# Patient Record
Sex: MALE | Race: ASIAN | URBAN - METROPOLITAN AREA
[De-identification: names, ages, dates, MRNs, and addresses within clinical notes are randomized per-mention and may not be internally consistent; named-entity substitution may affect disease eponyms.]

---

## 2023-11-20 ENCOUNTER — LAB ENCOUNTER (OUTPATIENT)
Dept: LAB | Age: 38
End: 2023-11-20
Attending: PHYSICIAN ASSISTANT
Payer: COMMERCIAL

## 2023-11-20 DIAGNOSIS — Z01.812 PRE-OPERATIVE LABORATORY EXAMINATION: Primary | ICD-10-CM

## 2023-11-20 LAB
ALBUMIN SERPL-MCNC: 4.3 G/DL (ref 3.4–5)
ALBUMIN/GLOB SERPL: 0.9 {RATIO} (ref 1–2)
ALP LIVER SERPL-CCNC: 110 U/L
ALT SERPL-CCNC: 86 U/L
ANION GAP SERPL CALC-SCNC: 9 MMOL/L (ref 0–18)
APTT PPP: 31.3 SECONDS (ref 23.3–35.6)
AST SERPL-CCNC: 41 U/L (ref 15–37)
BASOPHILS # BLD AUTO: 0.02 X10(3) UL (ref 0–0.2)
BASOPHILS NFR BLD AUTO: 0.3 %
BILIRUB SERPL-MCNC: 0.6 MG/DL (ref 0.1–2)
BILIRUB UR QL STRIP.AUTO: NEGATIVE
BUN BLD-MCNC: 20 MG/DL (ref 9–23)
CALCIUM BLD-MCNC: 9.3 MG/DL (ref 8.5–10.1)
CHLORIDE SERPL-SCNC: 103 MMOL/L (ref 98–112)
CHOLEST SERPL-MCNC: 146 MG/DL (ref ?–200)
CO2 SERPL-SCNC: 25 MMOL/L (ref 21–32)
COLOR UR AUTO: YELLOW
CREAT BLD-MCNC: 1.4 MG/DL
CRP SERPL HS-MCNC: 25.8 MG/L (ref ?–3)
EGFRCR SERPLBLD CKD-EPI 2021: 67 ML/MIN/1.73M2 (ref 60–?)
EOSINOPHIL # BLD AUTO: 0.33 X10(3) UL (ref 0–0.7)
EOSINOPHIL NFR BLD AUTO: 4.3 %
ERYTHROCYTE [DISTWIDTH] IN BLOOD BY AUTOMATED COUNT: 15.1 %
EST. AVERAGE GLUCOSE BLD GHB EST-MCNC: 160 MG/DL (ref 68–126)
FASTING PATIENT LIPID ANSWER: YES
FASTING STATUS PATIENT QL REPORTED: YES
GLOBULIN PLAS-MCNC: 4.7 G/DL (ref 2.8–4.4)
GLUCOSE BLD-MCNC: 117 MG/DL (ref 70–99)
GLUCOSE UR STRIP.AUTO-MCNC: NORMAL MG/DL
HBA1C MFR BLD: 7.2 % (ref ?–5.7)
HCT VFR BLD AUTO: 47.5 %
HDLC SERPL-MCNC: 43 MG/DL (ref 40–59)
HGB BLD-MCNC: 14.9 G/DL
HYALINE CASTS #/AREA URNS AUTO: PRESENT /LPF
IMM GRANULOCYTES # BLD AUTO: 0.03 X10(3) UL (ref 0–1)
IMM GRANULOCYTES NFR BLD: 0.4 %
INR BLD: 1.15 (ref 0.8–1.2)
KETONES UR STRIP.AUTO-MCNC: NEGATIVE MG/DL
LDLC SERPL CALC-MCNC: 83 MG/DL (ref ?–100)
LEUKOCYTE ESTERASE UR QL STRIP.AUTO: NEGATIVE
LYMPHOCYTES # BLD AUTO: 1.53 X10(3) UL (ref 1–4)
LYMPHOCYTES NFR BLD AUTO: 19.9 %
MCH RBC QN AUTO: 26.4 PG (ref 26–34)
MCHC RBC AUTO-ENTMCNC: 31.4 G/DL (ref 31–37)
MCV RBC AUTO: 84.1 FL
MONOCYTES # BLD AUTO: 0.51 X10(3) UL (ref 0.1–1)
MONOCYTES NFR BLD AUTO: 6.6 %
NEUTROPHILS # BLD AUTO: 5.27 X10 (3) UL (ref 1.5–7.7)
NEUTROPHILS # BLD AUTO: 5.27 X10(3) UL (ref 1.5–7.7)
NEUTROPHILS NFR BLD AUTO: 68.5 %
NITRITE UR QL STRIP.AUTO: NEGATIVE
NONHDLC SERPL-MCNC: 103 MG/DL (ref ?–130)
OSMOLALITY SERPL CALC.SUM OF ELEC: 288 MOSM/KG (ref 275–295)
PH UR STRIP.AUTO: 5.5 [PH] (ref 5–8)
PLATELET # BLD AUTO: 250 10(3)UL (ref 150–450)
POTASSIUM SERPL-SCNC: 3.7 MMOL/L (ref 3.5–5.1)
PROT SERPL-MCNC: 9 G/DL (ref 6.4–8.2)
PROT UR STRIP.AUTO-MCNC: 30 MG/DL
PROTHROMBIN TIME: 14.7 SECONDS (ref 11.6–14.8)
PTH-INTACT SERPL-MCNC: 125.9 PG/ML (ref 18.5–88)
RBC # BLD AUTO: 5.65 X10(6)UL
RBC UR QL AUTO: NEGATIVE
SODIUM SERPL-SCNC: 137 MMOL/L (ref 136–145)
SP GR UR STRIP.AUTO: >1.03 (ref 1–1.03)
T3 SERPL-MCNC: 107 NG/DL (ref 60–181)
T3FREE SERPL-MCNC: 2.75 PG/ML (ref 2.4–4.2)
T4 FREE SERPL-MCNC: 1.2 NG/DL (ref 0.8–1.7)
TRIGL SERPL-MCNC: 111 MG/DL (ref 30–149)
TSI SER-ACNC: 3.14 MIU/ML (ref 0.36–3.74)
URATE SERPL-MCNC: 6.3 MG/DL
UROBILINOGEN UR STRIP.AUTO-MCNC: NORMAL MG/DL
VIT B12 SERPL-MCNC: 241 PG/ML (ref 193–986)
VIT D+METAB SERPL-MCNC: 6.2 NG/ML (ref 30–100)
VLDLC SERPL CALC-MCNC: 18 MG/DL (ref 0–30)
WBC # BLD AUTO: 7.7 X10(3) UL (ref 4–11)

## 2023-11-20 PROCEDURE — 84443 ASSAY THYROID STIM HORMONE: CPT

## 2023-11-20 PROCEDURE — 84255 ASSAY OF SELENIUM: CPT

## 2023-11-20 PROCEDURE — 80061 LIPID PANEL: CPT

## 2023-11-20 PROCEDURE — 84630 ASSAY OF ZINC: CPT

## 2023-11-20 PROCEDURE — 84550 ASSAY OF BLOOD/URIC ACID: CPT

## 2023-11-20 PROCEDURE — 85610 PROTHROMBIN TIME: CPT

## 2023-11-20 PROCEDURE — 81001 URINALYSIS AUTO W/SCOPE: CPT

## 2023-11-20 PROCEDURE — 84481 FREE ASSAY (FT-3): CPT

## 2023-11-20 PROCEDURE — 85025 COMPLETE CBC W/AUTO DIFF WBC: CPT

## 2023-11-20 PROCEDURE — 86141 C-REACTIVE PROTEIN HS: CPT

## 2023-11-20 PROCEDURE — 85730 THROMBOPLASTIN TIME PARTIAL: CPT

## 2023-11-20 PROCEDURE — 82306 VITAMIN D 25 HYDROXY: CPT

## 2023-11-20 PROCEDURE — 83036 HEMOGLOBIN GLYCOSYLATED A1C: CPT

## 2023-11-20 PROCEDURE — 80053 COMPREHEN METABOLIC PANEL: CPT

## 2023-11-20 PROCEDURE — 36415 COLL VENOUS BLD VENIPUNCTURE: CPT

## 2023-11-20 PROCEDURE — 84439 ASSAY OF FREE THYROXINE: CPT

## 2023-11-20 PROCEDURE — 82607 VITAMIN B-12: CPT

## 2023-11-20 PROCEDURE — 83970 ASSAY OF PARATHORMONE: CPT

## 2023-11-24 LAB — SELENIUM LVL: 139 UG/L

## 2023-12-01 LAB — ZINC: 79 UG/DL

## 2023-12-07 ENCOUNTER — LAB ENCOUNTER (OUTPATIENT)
Dept: LAB | Age: 38
End: 2023-12-07
Attending: SURGERY
Payer: COMMERCIAL

## 2023-12-07 DIAGNOSIS — Z01.818 PREOP TESTING: ICD-10-CM

## 2023-12-07 DIAGNOSIS — E66.01 MORBID OBESITY (HCC): Primary | ICD-10-CM

## 2023-12-07 PROCEDURE — 83013 H PYLORI (C-13) BREATH: CPT

## 2023-12-10 LAB — H PYLORI BREATH TEST: NEGATIVE

## 2025-05-21 ENCOUNTER — TELEPHONE (OUTPATIENT)
Dept: PHYSICAL THERAPY | Facility: HOSPITAL | Age: 40
End: 2025-05-21
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
## 2025-05-27 ENCOUNTER — OFFICE VISIT (OUTPATIENT)
Dept: SPEECH THERAPY | Facility: HOSPITAL | Age: 40
End: 2025-05-27
Payer: COMMERCIAL

## 2025-05-27 DIAGNOSIS — R41.841 COGNITIVE COMMUNICATION DEFICIT: Primary | ICD-10-CM

## 2025-05-27 DIAGNOSIS — F09 COGNITIVE DISORDER: ICD-10-CM

## 2025-05-27 DIAGNOSIS — S06.330S: ICD-10-CM

## 2025-05-27 PROCEDURE — 92523 SPEECH SOUND LANG COMPREHEN: CPT
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
## 2025-05-28 ENCOUNTER — OFFICE VISIT (OUTPATIENT)
Dept: OCCUPATIONAL MEDICINE | Facility: HOSPITAL | Age: 40
End: 2025-05-28
Payer: COMMERCIAL

## 2025-05-28 ENCOUNTER — OFFICE VISIT (OUTPATIENT)
Dept: PHYSICAL THERAPY | Facility: HOSPITAL | Age: 40
End: 2025-05-28
Payer: COMMERCIAL

## 2025-05-28 PROCEDURE — 97110 THERAPEUTIC EXERCISES: CPT

## 2025-05-28 PROCEDURE — 97112 NEUROMUSCULAR REEDUCATION: CPT

## 2025-05-28 PROCEDURE — 97530 THERAPEUTIC ACTIVITIES: CPT

## 2025-05-28 NOTE — PROGRESS NOTES
Patient: Ender Aguirre (40 year old, male) Referring Provider:  Insurance:   Diagnosis: Cognitive disorder (F09)  Impaired mobility (Z74.09)  Intraparenchymal hematoma of brain without loss of consciousness, sequela (S06.330S) Jerson Somers  [facility]   Date of Surgery: 5/8/2025 Next MD visit:  N/A   Precautions:  None n/a Referral Information:   Date of Injury: 5/7/2025 Date of Evaluation: Req: 0, Auth: 0, Exp:     05/22/25 POC Auth Visits:          Today's Date   5/28/2025    Subjective  Patient presents to OT stating that his symptoms feel about the same as during his previous visit. He expressed a strong desire to return to driving as well as other functional tasks.       Pain: 4/10     Objective  Patient engaged in cognitive activities targeting decision-making and problem-solving skills.      Orthotics: cervical brace     ROM and Strength:  (* denotes performed with pain)  Hand Strength (lbs) R L      62.8 76.6 lbs     2 pt Pinch 9 9     3 pt Pinch 15 16     Lateral pinch 15 19       Neurological:  Cognition:   Overall Cognitive Status: impaired  Arousal/Alertness: appropriate responses to stimuli  Attention Span: difficulty dividing attention  Orientation Level: oriented x4  Memory: impaired working memory  Initiation: appears intact  Motor Planning: intact  Perseveration: not present  Awareness of Errors: assistance required to identify errors made  Awareness of Deficits: decreased awareness of deficits (patient reports not understaning why he may be having difficulty with some cognitive processes)  Problem Solving: assistance required to generate solutions    Mcgrew Cognitive Assessment: 19/30 with deficits in:  Visuospatial/executive function, attention, language, abstraction, and delayed recall.       ,   Vision: will be assessed at the following visit    Current Vision: other  Visual History: brain injury  Patient Visual Report: blurring of print when reading  Ocular Range of Motion: restricted on the  right  Head Position: -- (patient is in cervical brace)  Tracking:   tracked in all 4 quadrants smoothly    Saccades:  undershooting on the R side   Convergence:  breaks about 6 inches from nose  Acuity: able to read name tag with little difficulty.     ,   Perception:   Overall Perception Status: impaired  Left Right Discrimination: -- (R sided hemianopia)  Body Scheme: intact  Left Attention: intact  Right Attention: impaired   Visual Closure:  intact  Figure Ground:  intact  Depth Perception:  intact   Spatial Orientation:  intact  Visual Discrimination: R sided menianopia        Sensory: WNL  Spasticity: n/a    Coordination:   9 Hole Peg Test: R: 35.95 seconds   L: 19.99 seconds        Assessment  Patient continues to demonstrate motivation toward functional independence, particularly in his expressed goal of returning to driving. OT recommended the use of simulated driving activities, such as video games like [employer], to safely practice and enhance reaction time, visual-motor coordination, and decision-making skills. His engagement and motivation indicate good potential for progress with continued therapeutic intervention.    Goals (to be met in 12 visits)   Pt will improve in 10 lbs in  strength to increase upper extremity strength, range of motion, and coordination to support daily tasks.    Pt will enhance fine motor skills for improved hand-eye coordination and object manipulation to support self-care tasks,   Pt will increase endurance and activity tolerance to 30 minutes with no breaks to support participation in ADL/IADL.    Pt will be independent and compliant with comprehensive HEP to maintain progress achieved in OT.    Pt will improve sustained attention by independently completing a familiar task for 30 minutes with no breaks to support independence and success in activities like leisure and social participation.    Pt will initiate and complete a multi-step task, such as meal preparation or  household chores by independently organizing and following a written or digital checklist.          Plan  Patient will be seen 1-2 x/week or a total of 12 visits over a 90 day period. Treatment will include: Therapeutic Activities; Self-Care Home Management; Neuromuscular Re-education; Therapeutic Exercise; Home Exercise Program instruction; Electrical stimulation (attended); Ultrasound; Manual Therapy    Treatment Last 4 Visits        5/22/2025 5/28/2025   OT Treatment   Treatment Day  2   Therapeutic Exercise HEP review  Vision assessment   9-hole peg      Therapeutic Activity  Sudoku   Blink    Therapeutic Exercise Min 10 15   Ther Activity Min  30   Eval Min 35    Total Timed Procedures 10 45   Total Service Procedures 45 45   Total Time 45 45         HEP  Luminosity   Cognitive recovery strategies: daily orientation     Charges     TAx2, TEx1

## 2025-05-28 NOTE — PROGRESS NOTES
Patient: Ender Aguirre (40 year old, male) Referring Provider:  Insurance:   Diagnosis: Cognitive disorder (F09)  Impaired mobility (Z74.09)  Intraparenchymal hematoma of brain without loss of consciousness, sequela (S06.330S) Physician Alvatamalik BOWER   Date of Surgery: No data recorded Next MD visit:  N/A   Precautions:  Fall Risk No data recorded Referral Information:    Date of Evaluation: Req: 1, Auth: 1, Exp: 5/20/2026 05/22/25 POC Auth Visits:  10       Today's Date   5/28/2025    Subjective  He has is neuro surgery follow up on 6/18 and CT with contrast (with [facility]). Pt feels his attention and energy are normal.  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       Pain: 0/10     Objective  SLS on airex:20\"         Assessment  Pt was given 3 words to remeber at start of session (banana, cat, tree), he was able to recall them without prompting 3 min later. After asking to recall them at minute 10, he could not, but used assossciation to say yellow, when clued that he was close with the color he came up with banana but could not recall the others. His balance was decent but he does tend to rely on ankle strategies on compliant surfaces. He still has amnesia surrounding the accident. WIll cont to progress dynamic balance with cog activites. Administererd a balance HEP, add to this as needed.    Goals (to be met in 10 visits)     Not Met Progress Toward Partially Met Met    Pt will demonstrate improved SLS to >30 seconds YURIDIA to promote safety and decrease risk of falls on uneven surfaces such as grass and gravel. []  []  []  []    Pt will complete a 6MWT to determine baseline walking endurance.  []  []  []  []    Pt will perform FGA with score of 28/30 or greater with least restrictive AD to demonstrate ability to ambulate safely in crowded and busy environments such as the grocery store. []  []  []  []    Pt will be able to ambulate without path deviation while completing dual task to be able to safely navigate  community spaces without losing balance. []  []  []  []    Pt will complete NB EC balance on complaint surface with minimal to no sway to safely be able to  shower without loss of balance.  []  []  []  []    Pt will be independent and compliant with comprehensive HEP to maintain progress achieved in PT. []  []  []  []        Plan  Progress dynamic balance c cog activites.    Treatment Last 4 Visits  Treatment Day: 2       5/22/2025 5/28/2025   Neuro Treatment   Therapeutic Exercise  5 min biodex treadmill at 65 cm step length at 1.9 mph  15x posterior lunge   Neuro Re-Education Pt education: reducing cognitive load with physical activities to prevent falls or running into things. Education on length of recovery and recovery post TBI \"Banana cat tree\" given at start of session, tested him on it 10 min later (0% correct without cuing associated yellow with banana).   to pegs, left and right hand, standing romberg on airex  2x20\" tandem on airex while reaching for and calling out name/suite of cards, R/L no HHA  10x step up to upside down bosu  2x30\"  romberg EC with tactile cues on spine.   Biodex:  Limits of stabilityx2 (level 6 and 8)  Maze control x2     Therapeutic Exercise Minutes  10   Neuro Re-Educ Minutes 10 35   Evaluation Minutes 25    Total Time Of Timed Procedures 10 45   Total Time Of Service-Based Procedures 25 0   Total Treatment Time 35 45   HEP  Access Code: [email or web address]  URL: [email or web address]  Date: 05/28/2025  Prepared by: Allison Torres    Exercises  - Romberg Stance with Eyes Closed  - 1 x daily - 7 x weekly - 3 sets - 30sec hold  - Tandem Walking  - 1 x daily - 7 x weekly - 3 sets  - Reverse Lunge  - 1 x daily - 7 x weekly - 3 sets - 10 reps  - Single Leg Stance  - 1 x daily - 7 x weekly - 3 sets - 20 sec hold        HEP  Access Code: [email or web address]  URL: [email or web address]  Date: 05/28/2025  Prepared by: Allison Ma  - Romberg  Stance with Eyes Closed  - 1 x daily - 7 x weekly - 3 sets - 30sec hold  - Tandem Walking  - 1 x daily - 7 x weekly - 3 sets  - Reverse Lunge  - 1 x daily - 7 x weekly - 3 sets - 10 reps  - Single Leg Stance  - 1 x daily - 7 x weekly - 3 sets - 20 sec hold    Charges  therex;1, neuro re-ed:2

## 2025-05-29 ENCOUNTER — APPOINTMENT (OUTPATIENT)
Dept: SPEECH THERAPY | Facility: HOSPITAL | Age: 40
End: 2025-05-29
Payer: COMMERCIAL

## 2025-05-29 ENCOUNTER — OFFICE VISIT (OUTPATIENT)
Dept: SPEECH THERAPY | Facility: HOSPITAL | Age: 40
End: 2025-05-29
Payer: COMMERCIAL

## 2025-05-29 PROCEDURE — 92507 TX SP LANG VOICE COMM INDIV: CPT

## 2025-05-29 NOTE — PROGRESS NOTES
Patient: Ender Aguirre (40 year old, male) Referring Provider:  Insurance:   Diagnosis: cognitive communication deficit Physician Nonstaff  [employer]   Precautions:  Other (use comment) (TBI, vision deficits) Next MD visit:  N/A    No data recorded Referral Information:    Date of Evaluation: Req: 0, Auth: 0, Exp:     05/27/25 POC Auth Visits:  12       Today's Date   5/29/2025        Treatment Day: 2    Subjective  Patient arrived on time to session accompanied by his wife. Patient participated actively in therapeutic tasks.       Pain: 0/10     Objective  See goals below.     Goals (to be met in 12 visits)     Patient will verbalize compensatory memory strategies (processing, working memory, short-term memory, attention, problem-solving) and describe 1 use per strategy given min verbal and visual cues.   Trained on compensatory WRAP strategies. Patient demonstrates understanding. Further training required.   Progress: Progressing   2.   Patient will utilize an external memory aid to recall daily activities and/or prospective tasks within 80% of opportunities given min verbal and visual cues.   Trained on use of external notebook to support recall. Patient to attempt to implement this at home.   Progress:   Progressing   3.   Patient will recall average of 6+ units of novel information after 5 minute delay given mod verbal and visual cues for retrieval.   3 units following immediate recall with 2 repetitions of 1-paragraph length reading.   Progress: Progressing   4.   Patient will demonstrate sustained attention by maintaining focus during a task for 10 minutes given min verbal and visual cues in a structured environment without distractions.   10 minutes given mod-max verbal and visual cues to redirect.   Progress: Progressing   5.  Patient will describe pictures/objects using average of 4 attributes (e.g. function, material, location, etc) given min verbal and visual cues.   4 attributes given direct instruction    Progress: Progressing                        Assessment  Patient presents with cognitive communication deficits c/b impulsivity, impaired memory, attention, problem solving, and executive functioning, and lexical retrieval deficits which appear to be 2/2 impairments in time processing and mental flexibility. Functional deficits include but are not limited to difficulty performing at prior level of independence d/t cognitive deficits. Patient demonstrates receptiveness to training on compensatory memory and word finding strategies. Patient requires continued training and structured practice to support learning and implementation of supports. Continued intervention is medically necessary.    Plan  Continue speech therapy targeting cognitive communication.    HEP  WRAP, spaced retrieval, word finding strategies, reading comprehension     Charges  [id number]    Total Treatment Time: 45 min

## 2025-05-30 ENCOUNTER — APPOINTMENT (OUTPATIENT)
Dept: OCCUPATIONAL MEDICINE | Facility: HOSPITAL | Age: 40
End: 2025-05-30
Payer: COMMERCIAL

## 2025-05-30 ENCOUNTER — OFFICE VISIT (OUTPATIENT)
Dept: OCCUPATIONAL MEDICINE | Facility: HOSPITAL | Age: 40
End: 2025-05-30
Payer: COMMERCIAL

## 2025-05-30 PROCEDURE — 97530 THERAPEUTIC ACTIVITIES: CPT

## 2025-06-01 NOTE — PROGRESS NOTES
Patient: Ender Aguirre (40 year old, male) Referring Provider:  Insurance:   Diagnosis: Cognitive disorder (F09)  Impaired mobility (Z74.09)  Intraparenchymal hematoma of brain without loss of consciousness, sequela (S06.330S) Jerson BOWER   Date of Surgery: 5/8/2025 Next MD visit:  N/A   Precautions:  None n/a Referral Information:   Date of Injury: 5/7/2025 Date of Evaluation: Req: 0, Auth: 0, Exp:     05/22/25 POC Auth Visits:          Today's Date   5/30/2025    Subjective  Patient presents to OT expressing concern regarding when his vision will return to baseline. He shared that his primary goal is to return to driving and is feeling frustrated with the anticipated timeline to achieve this.       Pain: 3/10     Objective  Patient presents to OT expressing concern regarding when his vision will return to baseline. He shared that his primary goal is to return to driving and is feeling frustrated with the anticipated timeline to achieve this.      Orthotics: cervical brace     ROM and Strength:  (* denotes performed with pain)  Hand Strength (lbs) R L      62.8 76.6 lbs     2 pt Pinch 9 9     3 pt Pinch 15 16     Lateral pinch 15 19       Neurological:  Cognition:   Overall Cognitive Status: impaired  Arousal/Alertness: appropriate responses to stimuli  Attention Span: difficulty dividing attention  Orientation Level: oriented x4  Memory: impaired working memory  Initiation: appears intact  Motor Planning: intact  Perseveration: not present  Awareness of Errors: assistance required to identify errors made  Awareness of Deficits: decreased awareness of deficits (patient reports not understaning why he may be having difficulty with some cognitive processes)  Problem Solving: assistance required to generate solutions    Patrice Cognitive Assessment: 19/30 with deficits in:  Visuospatial/executive function, attention, language, abstraction, and delayed recall.       ,   Vision: will be assessed at the  following visit    Current Vision: other  Visual History: brain injury  Patient Visual Report: blurring of print when reading  Ocular Range of Motion: restricted on the right  Head Position: -- (patient is in cervical brace)  Tracking:   tracked in all 4 quadrants smoothly    Saccades:  undershooting on the R side   Convergence:  breaks about 6 inches from nose  Acuity: able to read name tag with little difficulty.     ,   Perception:   Overall Perception Status: impaired  Left Right Discrimination: -- (R sided hemianopia)  Body Scheme: intact  Left Attention: intact  Right Attention: impaired   Visual Closure:  intact  Figure Ground:  intact  Depth Perception:  intact   Spatial Orientation:  intact  Visual Discrimination: R sided menianopia        Sensory: WNL  Spasticity: n/a    Coordination:   9 Hole Peg Test: R: 35.95 seconds   L: 19.99 seconds          Assessment  Patient continues to make progress cognitively and demonstrates improved tolerance for errors, with reduced frustration when making mistakes. His insight into current limitations and future goals remains strong, which supports engagement and motivation in therapy. Continued focus on visual-perceptual skills, cognitive endurance, and problem solving will support his long-term goal of community reintegration, including driving.    Goals (to be met in 12 visits)   Pt will improve in 10 lbs in  strength to increase upper extremity strength, range of motion, and coordination to support daily tasks.    Pt will enhance fine motor skills for improved hand-eye coordination and object manipulation to support self-care tasks,   Pt will increase endurance and activity tolerance to 30 minutes with no breaks to support participation in ADL/IADL.    Pt will be independent and compliant with comprehensive HEP to maintain progress achieved in OT.    Pt will improve sustained attention by independently completing a familiar task for 30 minutes with no breaks to  support independence and success in activities like leisure and social participation.    Pt will initiate and complete a multi-step task, such as meal preparation or household chores by independently organizing and following a written or digital checklist.            Plan  Patient will be seen 1-2 x/week or a total of 12 visits over a 90 day period. Treatment will include: Therapeutic Activities; Self-Care Home Management; Neuromuscular Re-education; Therapeutic Exercise; Home Exercise Program instruction; Electrical stimulation (attended); Ultrasound; Manual Therapy    Treatment Last 4 Visits        5/22/2025 5/28/2025 5/30/2025   OT Treatment   Treatment Day  2    Therapeutic Exercise HEP review  Vision assessment   9-hole peg       Therapeutic Activity  Sudoku   Blink  Sodoku (puzzle places on Right side to promote visual scanning to R side of visual field)   Perfection (puzzle places on Right side to promote visual scanning to R side of visual field)    Therapeutic Exercise Min 10 15    Ther Activity Min  30 45   Eval Min 35     Total Timed Procedures 10 45 45   Total Service Procedures 45 45 45   Total Time 45 45 45         HEP  Luminosity   Cognitive recovery strategies: daily orientation     Charges     TAx3

## 2025-06-02 ENCOUNTER — OFFICE VISIT (OUTPATIENT)
Dept: SPEECH THERAPY | Facility: HOSPITAL | Age: 40
End: 2025-06-02
Payer: COMMERCIAL

## 2025-06-02 ENCOUNTER — APPOINTMENT (OUTPATIENT)
Dept: OCCUPATIONAL MEDICINE | Facility: HOSPITAL | Age: 40
End: 2025-06-02
Payer: COMMERCIAL

## 2025-06-02 PROCEDURE — 92507 TX SP LANG VOICE COMM INDIV: CPT

## 2025-06-02 NOTE — PROGRESS NOTES
Patient: Ender Aguirre (40 year old, male) Referring Provider:  Insurance:   Diagnosis: cognitive communication deficit Physician Nonstaff  [employer]   Precautions:  Other (use comment) (TBI, vision deficits) Next MD visit:  N/A    No data recorded Referral Information:    Date of Evaluation: Req: 0, Auth: 0, Exp:     05/27/25 POC Auth Visits:  12       Today's Date   6/2/2025        Treatment Day: 3    Subjective  Patient arrived on time to session accompanied by his wife. Patient participated actively in therapeutic tasks.       Pain: 0/10     Objective  See goals below.      Goals (to be met in 12 visits)  Patient will verbalize compensatory memory strategies (processing, working memory, short-term memory, attention, problem-solving) and describe 1 use per strategy given min verbal and visual cues.   Reports attempts to write information down. Will continue to try to embed in daily routine.   Progress: Progressing   2.   Patient will utilize an external memory aid to recall daily activities and/or prospective tasks within 80% of opportunities given min verbal and visual cues.   Continue to discuss use of external memory aids within the home and at appointments   Progress:   Progressing   3.   Patient will recall average of 6+ units of novel information after 5 minute delay given mod verbal and visual cues for retrieval.   4 units following immediate recall with 2 repetitions of 1-paragraph length reading.   Progress: Progressing   4.   Patient will demonstrate sustained attention by maintaining focus during a task for 10 minutes given min verbal and visual cues in a structured environment without distractions.   10 minutes given mod verbal and visual cues to redirect.   Progress: Progressing   5.  Patient will describe pictures/objects using average of 4 attributes (e.g. function, material, location, etc) given min verbal and visual cues.   Goal not targeted on this date. Patient demonstrates improved lexical  retrieval skills. Previous session data: 4 attributes given direct instruction   Progress: Progressing                        Assessment  Patient presents with cognitive communication deficits c/b impulsivity, impaired memory, attention, problem solving, and executive functioning, and lexical retrieval deficits which appear to be 2/2 impairments in time processing and mental flexibility. Functional deficits include but are not limited to difficulty performing at prior level of independence d/t cognitive deficits. Patient does demonstrate effective lexical retrieval for communication and summarization tasks. Patient continues to require cueing to reduce impulsivity and increase accuracy of recall. Continued intervention is medically necessary.    Plan  Continue speech therapy targeting cognitive communication.    HEP  WRAP, spaced retrieval, word finding strategies, reading comprehension     Charges  [id number]    Total Treatment Time: 45 min

## 2025-06-04 ENCOUNTER — APPOINTMENT (OUTPATIENT)
Dept: SPEECH THERAPY | Facility: HOSPITAL | Age: 40
End: 2025-06-04
Payer: COMMERCIAL

## 2025-06-04 ENCOUNTER — OFFICE VISIT (OUTPATIENT)
Dept: PHYSICAL THERAPY | Facility: HOSPITAL | Age: 40
End: 2025-06-04
Payer: COMMERCIAL

## 2025-06-04 ENCOUNTER — OFFICE VISIT (OUTPATIENT)
Dept: SPEECH THERAPY | Facility: HOSPITAL | Age: 40
End: 2025-06-04
Payer: COMMERCIAL

## 2025-06-04 PROCEDURE — 97116 GAIT TRAINING THERAPY: CPT

## 2025-06-04 PROCEDURE — 92507 TX SP LANG VOICE COMM INDIV: CPT

## 2025-06-04 PROCEDURE — 97112 NEUROMUSCULAR REEDUCATION: CPT

## 2025-06-04 PROCEDURE — 97110 THERAPEUTIC EXERCISES: CPT

## 2025-06-04 NOTE — PROGRESS NOTES
Patient: Ender Aguirre (40 year old, male) Referring Provider:  Insurance:   Diagnosis: cognitive communication deficit Physician Nonstaff  [employer]   Precautions:  Other (use comment) (TBI, vision deficits) Next MD visit:  N/A    No data recorded Referral Information:    Date of Evaluation: Req: 0, Auth: 0, Exp:     05/27/25 POC Auth Visits:  12       Today's Date   6/4/2025        Treatment Day: 4    Subjective  Patient arrived on time to session following PT session accompanied by his wife. Patient participated actively in therapeutic tasks.       Pain: 0/10     Objective  See goals below.         Goals (to be met in 12 visits)  Patient will verbalize compensatory memory strategies (processing, working memory, short-term memory, attention, problem-solving) and describe 1 use per strategy given min verbal and visual cues.   Reports attempts to write information down. Will continue to try to embed in daily routine.   Progress: Progressing   2.   Patient will utilize an external memory aid to recall daily activities and/or prospective tasks within 80% of opportunities given min verbal and visual cues.   Continue to discuss use of external memory aids within the home and at appointments   Progress:   Progressing   3.   Patient will recall average of 6+ units of novel information after 5 minute delay given mod verbal and visual cues for retrieval.   8 units after 15 minute delay following repetition of article and mod verbal and visual cues for complex encoding of information.   Progress: Progressing   4.   Patient will demonstrate sustained attention by maintaining focus during a task for 10 minutes given min verbal and visual cues in a structured environment without distractions.   10 minutes given min verbal and visual cues.   Progress: Progressing   5.  Patient will describe pictures/objects using average of 4 attributes (e.g. function, material, location, etc) given min verbal and visual cues.   Patient exhibits  functional lexical retrieval for daily communication.   Progress: Progressing        Assessment  Patient presents with cognitive communication deficits c/b impulsivity, impaired memory, attention, problem solving, and executive functioning, and lexical retrieval deficits which appear to be 2/2 impairments in time processing and mental flexibility. Functional deficits include but are not limited to difficulty performing at prior level of independence d/t cognitive deficits. Patient demonstrates improved memory for paragraph length stimuli and benefits from verbal repetition, preparatory sets, and multiple choice cues for accurate recall. Patient demonstrates effective lexical retrieval for daily communication. Continued intervention targeting cognitive communication is medically necessary.    Plan  Continue speech therapy targeting cognitive communication.    HEP  WRAP, spaced retrieval, word finding strategies, reading comprehension     Charges  [id number]    Total Treatment Time: 45 min

## 2025-06-04 NOTE — PROGRESS NOTES
Patient: Ender Agurire (40 year old, male) Referring Provider:  Insurance:   Diagnosis: Cognitive disorder (F09)  Impaired mobility (Z74.09)  Intraparenchymal hematoma of brain without loss of consciousness, sequela (S06.330S) Physician Alvatamalik BOWER   Date of Surgery: No data recorded Next MD visit:  N/A   Precautions:  Fall Risk No data recorded Referral Information:    Date of Evaluation: Req: 0, Auth: 0, Exp:     05/22/25 POC Auth Visits:  10       Today's Date   6/4/2025    Subjective  Pt doing well today, no changes to report. Has to avoid OH movements due to fx in the R shoulder       Pain: pain not reported     Objective  See tx flow sheet; improved recall of 3 words provided at beginning of session. After 10 min, able to recall 2 words with min cues, and requires 3rd work to be provided. Pt able to successfully recall 2 words at end of session with no cues, and min cues for recalling apple.          Assessment  Pt has good participation in session today. Improved recall of 3 words provided as seen above in objective. Continue with balance interventions with dual tasking as able. Pt has trouble with identifying difference between clubs and spades when naming and matching cards to board. Able to complete with ~25% assist and improves with repetition. Pt requires VC throughout session to slow down movements and improve control of activities to reduce fall risk. Gait training completed with alphabet game. Pt maintains good gait mechanics, 75% of time able to appropriately name girl or boy names in the alphabet, but with more challenging letters comes up with words that are not names. With cues he is able to come up with names some of the time following. Continue per plan of care to improve dynamic balance, cognitive activities and reduce impulsivity.    Goals (to be met in 10 visits)      Not Met Progress Toward Partially Met Met   Pt will demonstrate improved SLS to >30 seconds YURIDIA to promote safety and  decrease risk of falls on uneven surfaces such as grass and gravel. [] [] [] []   Pt will complete a 6MWT to determine baseline walking endurance.  [] [] [] []   Pt will perform FGA with score of 28/30 or greater with least restrictive AD to demonstrate ability to ambulate safely in crowded and busy environments such as the grocery store. [] [] [] []   Pt will be able to ambulate without path deviation while completing dual task to be able to safely navigate community spaces without losing balance. [] [] [] []   Pt will complete NB EC balance on complaint surface with minimal to no sway to safely be able to  shower without loss of balance.  [] [] [] []   Pt will be independent and compliant with comprehensive HEP to maintain progress achieved in PT. [] [] [] []             Plan  Progress dynamic balance c cog activites.    Treatment Last 4 Visits  Treatment Day: 3       5/22/2025 5/28/2025 6/4/2025   Neuro Treatment   Therapeutic Exercise  5 min biodex treadmill at 65 cm step length at 1.9 mph  15x posterior lunge 5 min biodex treadmill at 65 cm step length at 1.9 mph   15x posterior lunge w/ slider  15x lat lunge w/ slider     Neuro Re-Education Pt education: reducing cognitive load with physical activities to prevent falls or running into things. Education on length of recovery and recovery post TBI \"Banana cat tree\" given at start of session, tested him on it 10 min later (0% correct without cuing associated yellow with banana).   to pegs, left and right hand, standing romberg on airex  2x20\" tandem on airex while reaching for and calling out name/suite of cards, R/L no HHA  10x step up to upside down bosu  2x30\"  romberg EC with tactile cues on spine.   Biodex:  Limits of stabilityx2 (level 6 and 8)  Maze control x2   \"Apple, dog, car\" given at start of session, tested him on it 10 min later (2/3 right after 2 attempts w/o cues, has to be told apple, at end of session names 2 w/o cues, 1 cue needed  for apple but able to associate color)  10x step up to upside down bosu, 1 UE  10x lat step up to upside down bosu, no UE as able  Single leg, taps to cones, x10 bilat  2x45\" tandem on airex while reaching for and calling out name/suite of cards, R/L no HHA   2x30\"  romberg EC on airex with tactile cues on spine.   Biodex: w/ foam  Limits of stability x2 level 6   Maze control x2, level 6, BUE support  NB/half tandem on airex, matching cards to board with verbal call out of suite and number (most difficulty differentiating between clubs and spades, able to complete correctly ~75% of time)     Gait Training   Walking with [employer] game, multiple bouts. Pt able to accurately pick appropriate female or male name for letter of the alphabet ~80% of time. With more difficult letters, often uses words that are not names, verbal cues to attempt to pick name    Therapeutic Exercise Minutes  10 10   Neuro Re-Educ Minutes 10 35 20   Gait Training Minutes   10   Evaluation Minutes 25     Total Time Of Timed Procedures 10 45 40   Total Time Of Service-Based Procedures 25 0 0   Total Treatment Time 35 45 40   HEP  Access Code: [email or web address]  URL: [email or web address]  Date: 05/28/2025  Prepared by: Allison Torres    Exercises  - Romberg Stance with Eyes Closed  - 1 x daily - 7 x weekly - 3 sets - 30sec hold  - Tandem Walking  - 1 x daily - 7 x weekly - 3 sets  - Reverse Lunge  - 1 x daily - 7 x weekly - 3 sets - 10 reps  - Single Leg Stance  - 1 x daily - 7 x weekly - 3 sets - 20 sec hold         HEP  Access Code: [email or web address]  URL: [email or web address]  Date: 05/28/2025  Prepared by: Allison Torres    Exercises  - Romberg Stance with Eyes Closed  - 1 x daily - 7 x weekly - 3 sets - 30sec hold  - Tandem Walking  - 1 x daily - 7 x weekly - 3 sets  - Reverse Lunge  - 1 x daily - 7 x weekly - 3 sets - 10 reps  - Single Leg Stance  - 1 x daily - 7 x weekly - 3 sets - 20 sec hold    Charges  NR 2 TE 1  GT1

## 2025-06-09 ENCOUNTER — OFFICE VISIT (OUTPATIENT)
Dept: PHYSICAL THERAPY | Facility: HOSPITAL | Age: 40
End: 2025-06-09
Payer: COMMERCIAL

## 2025-06-09 ENCOUNTER — OFFICE VISIT (OUTPATIENT)
Dept: SPEECH THERAPY | Facility: HOSPITAL | Age: 40
End: 2025-06-09
Payer: COMMERCIAL

## 2025-06-09 ENCOUNTER — OFFICE VISIT (OUTPATIENT)
Dept: OCCUPATIONAL MEDICINE | Facility: HOSPITAL | Age: 40
End: 2025-06-09
Payer: COMMERCIAL

## 2025-06-09 PROCEDURE — 92507 TX SP LANG VOICE COMM INDIV: CPT

## 2025-06-09 PROCEDURE — 97110 THERAPEUTIC EXERCISES: CPT

## 2025-06-09 PROCEDURE — 97112 NEUROMUSCULAR REEDUCATION: CPT

## 2025-06-09 PROCEDURE — 97530 THERAPEUTIC ACTIVITIES: CPT

## 2025-06-09 PROCEDURE — 97116 GAIT TRAINING THERAPY: CPT

## 2025-06-09 NOTE — PROGRESS NOTES
Patient: Ender Aguirre (40 year old, male) Referring Provider:  Insurance:   Diagnosis: cognitive communication deficit Physician Nonstaff  [employer]   Precautions:  Other (use comment) (TBI, vision deficits) Next MD visit:  N/A    No data recorded Referral Information:    Date of Evaluation: Req: 0, Auth: 0, Exp:     05/27/25 POC Auth Visits:  12     Discharge Summary  Pt has attended 5 visits in Speech Therapy.   Today's Date   6/9/2025        Treatment Day: 5    Dear Dr. Somers  This letter is to inform you of Tanmay Aguirre's progress in speech-language therapy.    Since his initial evaluation, Tanmay has attended 5 sessions. Therapy sessions have targeted cognitive communication. A home exercise program (HEP) addressing use of compensatory strategies in the home has been provided and completed consistently. During this treatment period, Tanmay has demonstrated improved ability to communicate utilizing lexical retrieval, written expression, auditory comprehension, and reading comprehension skills. Tanmay continues to demonstrate need for cognitive therapy, and he is currently receiving this from occupational therapy. To target Tanmay's rehabilitative focus, it is recommended that he continue with OT cognitive therapy and discontinue from speech therapy at this time. Please re-consult given future need.      Subjective  Patient arrived on time to session following OT session. Participated actively in therapeutic tasks. Patient to continue cognitive therapy with occupational therapy as he does not present with verbal expression, auditory comprehension, reading comprehension, or written expression deficits at this time. Patient's occupational therapy is targeting cognition and will be sufficient for patient's needs as this time.       Pain: 0/10     Objective  See goals below.      Goals (to be met in 12 visits)  Patient will verbalize compensatory memory strategies (processing, working memory, short-term memory, attention,  problem-solving) and describe 1 use per strategy given min verbal and visual cues.   Reports attempts to write information down. Will continue to try to embed in daily routine.   Progress: Goal discontinued   2.   Patient will utilize an external memory aid to recall daily activities and/or prospective tasks within 80% of opportunities given min verbal and visual cues.   Continue to discuss use of external memory aids within the home and at appointments   Progress:   Goal discontinued   3.   Patient will recall average of 6+ units of novel information after 5 minute delay given mod verbal and visual cues for retrieval.   10 units after 15 minute delay following repetition of article and mod verbal and visual cues for complex encoding of information.   Progress: Goal met.   4.   Patient will demonstrate sustained attention by maintaining focus during a task for 10 minutes given min verbal and visual cues in a structured environment without distractions.   10 minutes given min verbal and visual cues.   Progress: Goal met.   5.  Patient will describe pictures/objects using average of 4 attributes (e.g. function, material, location, etc) given min verbal and visual cues.   Patient exhibits functional lexical retrieval for daily communication.   Progress: Goal met.        Assessment  Patient presents with cognitive deficits c/b impulsivity, impaired memory, attention, problem solving, and executive functioning. Patient has improved upon lexical retrieval and effective communication. Patient demonstrates receptiveness to training of compensatory strategies, however continues to require cognitive therapy. As patient is completing cognitive therapy with occupational therapy, it is recommended that he be discharged from speech therapy with focus on OT cognitive tasks.    Plan  Discontinue speech therapy and continuation of cognitive therapy through occupational therapy discipline.    HEP  WRAP, spaced retrieval, word finding  strategies, reading comprehension     Charges  [id number]    Total Treatment Time: 45 min    Patient/Family/Caregiver was advised of these findings, precautions, and treatment options and has agreed to actively participate in planning and for this course of care.    Thank you for your referral. If you have any questions, please contact me at Dept: [phone].    Sincerely,  Electronically signed by therapist: ELSY Arnold

## 2025-06-09 NOTE — PROGRESS NOTES
Patient: Ender Aguirre (40 year old, male) Referring Provider:  Insurance:   Diagnosis: Cognitive disorder (F09)  Impaired mobility (Z74.09)  Intraparenchymal hematoma of brain without loss of consciousness, sequela (S06.330S) Jerson Somers  CODIJOHN   Date of Surgery: 5/8/2025 Next MD visit:  N/A   Precautions:  None n/a Referral Information:   Date of Injury: 5/7/2025 Date of Evaluation: Req: 0, Auth: 0, Exp:     05/22/25 POC Auth Visits:          Today's Date   6/9/2025    Subjective  Patient reported that he drove himself to the appointment. He continues to experience right-sided hemianopia but states that he compensates by staying on the right side of the road. During the session, the patient demonstrated insight by identifying aspects of the activity and relating them to real-life functional skills.       Pain: 2/10     Objective  Patient engaged in a variety of cognitive tasks with minimal need for cueing or redirection. Activity was sustained for the full 45-minute session without a break.      Orthotics: cervical brace     ROM and Strength:  (* denotes performed with pain)  Hand Strength (lbs) R L      62.8 76.6 lbs     2 pt Pinch 9 9     3 pt Pinch 15 16     Lateral pinch 15 19       Neurological:  Cognition:   Overall Cognitive Status: impaired  Arousal/Alertness: appropriate responses to stimuli  Attention Span: difficulty dividing attention  Orientation Level: oriented x4  Memory: impaired working memory  Initiation: appears intact  Motor Planning: intact  Perseveration: not present  Awareness of Errors: assistance required to identify errors made  Awareness of Deficits: decreased awareness of deficits (patient reports not understaning why he may be having difficulty with some cognitive processes)  Problem Solving: assistance required to generate solutions    [place] Cognitive Assessment: 19/30 with deficits in:  Visuospatial/executive function, attention, language, abstraction, and delayed recall.        ,   Vision: will be assessed at the following visit    Current Vision: other  Visual History: brain injury  Patient Visual Report: blurring of print when reading  Ocular Range of Motion: restricted on the right  Head Position: -- (patient is in cervical brace)  Tracking:   tracked in all 4 quadrants smoothly    Saccades:  undershooting on the R side   Convergence:  breaks about 6 inches from nose  Acuity: able to read name tag with little difficulty.     ,   Perception:   Overall Perception Status: impaired  Left Right Discrimination: -- (R sided hemianopia)  Body Scheme: intact  Left Attention: intact  Right Attention: impaired   Visual Closure:  intact  Figure Ground:  intact  Depth Perception:  intact   Spatial Orientation:  intact  Visual Discrimination: R sided menianopia        Sensory: WNL  Spasticity: n/a    Coordination:   9 Hole Peg Test: R: 35.95 seconds   L: 19.99 seconds          Assessment  Patient demonstrates good activity tolerance and functional insight, as evidenced by his ability to link therapeutic tasks with daily life activities. However, due to the continued presence of right-sided hemianopia, OT strongly advised the patient to refrain from driving until medically cleared, as it presents a significant safety concern. Insight and cognitive engagement continue to improve, which is a positive indicator for future progress.    Goals (to be met in 12 visits)   Pt will improve in 10 lbs in  strength to increase upper extremity strength, range of motion, and coordination to support daily tasks.    Pt will enhance fine motor skills for improved hand-eye coordination and object manipulation to support self-care tasks,   Pt will increase endurance and activity tolerance to 30 minutes with no breaks to support participation in ADL/IADL.    Pt will be independent and compliant with comprehensive HEP to maintain progress achieved in OT.    Pt will improve sustained attention by independently  completing a familiar task for 30 minutes with no breaks to support independence and success in activities like leisure and social participation.    Pt will initiate and complete a multi-step task, such as meal preparation or household chores by independently organizing and following a written or digital checklist.              Plan  Patient will be seen 1-2 x/week or a total of 12 visits over a 90 day period. Treatment will include: Therapeutic Activities; Self-Care Home Management; Neuromuscular Re-education; Therapeutic Exercise; Home Exercise Program instruction; Electrical stimulation (attended); Ultrasound; Manual Therapy    Treatment Last 4 Visits        5/22/2025 5/28/2025 5/30/2025 6/9/2025   OT Treatment   Treatment Day  2  3   Therapeutic Exercise HEP review  Vision assessment   9-hole peg        Therapeutic Activity  Sudoku   Blink  Sodoku (puzzle places on Right side to promote visual scanning to R side of visual field)   Perfection (puzzle places on Right side to promote visual scanning to R side of visual field)  Sodoku (puzzle places on Right side to promote visual scanning to R side of visual field)   Perfection (puzzle places on Right side to promote visual scanning to R side of visual field)    Therapeutic Exercise Min 10 15     Ther Activity Min  30 45 45   Eval Min 35      Total Timed Procedures 10 45 45 45   Total Service Procedures 45 45 45 45   Total Time 45 45 45 45         HEP  Luminosity   Cognitive recovery strategies: daily orientation     Charges     TAx3

## 2025-06-09 NOTE — PROGRESS NOTES
Patient: Ender Aguirre (40 year old, male) Referring Provider:  Insurance:   Diagnosis: Cognitive disorder (F09)  Impaired mobility (Z74.09)  Intraparenchymal hematoma of brain without loss of consciousness, sequela (S06.330S) Physician Alvatamalik BOWER   Date of Surgery: No data recorded Next MD visit:  N/A   Precautions:  Fall Risk No data recorded Referral Information:    Date of Evaluation: Req: 0, Auth: 0, Exp:     05/22/25 POC Auth Visits:  10       Today's Date   6/9/2025    Subjective  Pt's wife reports he seems to be doing better. Was able to assemble his children's basketball hoop without assistance. Less issues with dual task during walking. Neuro said he can start riding bike again, however has not worn helmet.       Pain: pain not reported     Objective  Improved attendance to R side noted with blaze pod activity, pt able to identify lit up pod on R side without cues ~90% of time. Also improved gait w/ cog task and less cuing needed to name items in category for alphabet game          Assessment  Pt has improvements in dynamic balance w/ dual tasking. Utilized blaze pods for various activities, pt able to attend to R side without cues from therapist to find and hit lit up pod on the R side ~90% of the time. Slight increase in time to perform on R side, however improves with more repetitions. Pt able to complete tandem balance with turns to identify object on spot it card. Therapist calls out color and pt has to name object in that color, completes accurately 80% of time with very minimal cuing needed to name item. On occasion, pt says \"ice\" instead of \"igloo\" for example, but makes appropriate association. Discussed with pt importance of utilizing helmet when riding bike and risk for further injury. Pt verbalizes understanding and plans to get helmet following session today.    Goals (to be met in 10 visits)      Not Met Progress Toward Partially Met Met   Pt will demonstrate improved SLS to >30 seconds  REFILL:  Dulera inhaler 13gm    Use 2 inhalations darlin    Qty 1  Refills 3   YURIDIA to promote safety and decrease risk of falls on uneven surfaces such as grass and gravel. [] [x] [] []   Pt will complete a 6MWT to determine baseline walking endurance.  [] [x] [] []   Pt will perform FGA with score of 28/30 or greater with least restrictive AD to demonstrate ability to ambulate safely in crowded and busy environments such as the grocery store. [] [x] [] []   Pt will be able to ambulate without path deviation while completing dual task to be able to safely navigate community spaces without losing balance. [] [x] [] []   Pt will complete NB EC balance on complaint surface with minimal to no sway to safely be able to  shower without loss of balance.  [] [x] [] []   Pt will be independent and compliant with comprehensive HEP to maintain progress achieved in PT. [] [x] [] []                 Plan  Progress dynamic balance c cog activites.    Treatment Last 4 Visits  Treatment Day: 4 5/22/2025 5/28/2025 6/4/2025 6/9/2025   Neuro Treatment   Therapeutic Exercise  5 min biodex treadmill at 65 cm step length at 1.9 mph  15x posterior lunge 5 min biodex treadmill at 65 cm step length at 1.9 mph   15x posterior lunge w/ slider  15x lat lunge w/ slider   5 min biodex treadmill at 65 cm step length at 1.9 mph   DLP, #75, 3x15   Neuro Re-Education Pt education: reducing cognitive load with physical activities to prevent falls or running into things. Education on length of recovery and recovery post TBI \"Banana cat tree\" given at start of session, tested him on it 10 min later (0% correct without cuing associated yellow with banana).   to pegs, left and right hand, standing romberg on airex  2x20\" tandem on airex while reaching for and calling out name/suite of cards, R/L no HHA  10x step up to upside down bosu  2x30\"  romberg EC with tactile cues on spine.   Biodex:  Limits of stabilityx2 (level 6 and 8)  Maze control x2   \"Apple, dog, car\" given at start of session, tested him on it 10 min  later (2/3 right after 2 attempts w/o cues, has to be told apple, at end of session names 2 w/o cues, 1 cue needed for apple but able to associate color)  10x step up to upside down bosu, 1 UE  10x lat step up to upside down bosu, no UE as able  Single leg, taps to cones, x10 bilat  2x45\" tandem on airex while reaching for and calling out name/suite of cards, R/L no HHA   2x30\"  romberg EC on airex with tactile cues on spine.   Biodex: w/ foam  Limits of stability x2 level 6   Maze control x2, level 6, BUE support  NB/half tandem on airex, matching cards to board with verbal call out of suite and number (most difficulty differentiating between clubs and spades, able to complete correctly ~75% of time)   Blaze pods  - color catch, x3  - one leg balance 2x bilat  - color catch in plank position 2x  Tandem on airex, turning R/L to name objections on spot it cards  Tandem walk on airex beams, 3 laps  Side stepping on airex beams w/ ball toss, 3 laps      Gait Training   Walking with alphabet game, multiple bouts. Pt able to accurately pick appropriate female or male name for letter of the alphabet ~80% of time. With more difficult letters, often uses words that are not names, verbal cues to attempt to pick name  Walking with alphabet game, multiple bouts: able to identify appropriate foods for first category, difficulty with naming holidays in appropriate months, but can name months of children's birthday and his birthday       Therapeutic Exercise Minutes  10 10 10   Neuro Re-Educ Minutes 10 35 20 21   Gait Training Minutes   10 10   Evaluation Minutes 25      Total Time Of Timed Procedures 10 45 40 41   Total Time Of Service-Based Procedures 25 0 0 0   Total Treatment Time 35 45 40 41   HEP  Access Code: [email or web address]  URL: [email or web address]  Date: 05/28/2025  Prepared by: Allison Torres    Exercises  - Romberg Stance with Eyes Closed  - 1 x daily - 7 x weekly - 3 sets - 30sec hold  - Tandem Walking   - 1 x daily - 7 x weekly - 3 sets  - Reverse Lunge  - 1 x daily - 7 x weekly - 3 sets - 10 reps  - Single Leg Stance  - 1 x daily - 7 x weekly - 3 sets - 20 sec hold          HEP  Access Code: [email or web address]  URL: [email or web address]  Date: 05/28/2025  Prepared by: Allison Torres    Exercises  - Romberg Stance with Eyes Closed  - 1 x daily - 7 x weekly - 3 sets - 30sec hold  - Tandem Walking  - 1 x daily - 7 x weekly - 3 sets  - Reverse Lunge  - 1 x daily - 7 x weekly - 3 sets - 10 reps  - Single Leg Stance  - 1 x daily - 7 x weekly - 3 sets - 20 sec hold    Charges  NR 1 TE 1 GT 1

## 2025-06-11 ENCOUNTER — APPOINTMENT (OUTPATIENT)
Dept: SPEECH THERAPY | Facility: HOSPITAL | Age: 40
End: 2025-06-11
Payer: COMMERCIAL

## 2025-06-12 ENCOUNTER — OFFICE VISIT (OUTPATIENT)
Dept: PHYSICAL THERAPY | Facility: HOSPITAL | Age: 40
End: 2025-06-12
Payer: COMMERCIAL

## 2025-06-12 PROCEDURE — 97112 NEUROMUSCULAR REEDUCATION: CPT

## 2025-06-12 PROCEDURE — 97110 THERAPEUTIC EXERCISES: CPT

## 2025-06-12 PROCEDURE — 97116 GAIT TRAINING THERAPY: CPT

## 2025-06-12 NOTE — PROGRESS NOTES
Patient: Ender Aguirre (40 year old, male) Referring Provider:  Insurance:   Diagnosis: Cognitive disorder (F09)  Impaired mobility (Z74.09)  Intraparenchymal hematoma of brain without loss of consciousness, sequela (S06.330S) Physician Alvatamalik BOWER   Date of Surgery: No data recorded Next MD visit:  N/A   Precautions:  Fall Risk No data recorded Referral Information:    Date of Evaluation: Req: 0, Auth: 0, Exp:     05/22/25 POC Auth Visits:  10       Today's Date   6/12/2025    Subjective  Pt is doing well today, no changes to report since last session       Pain: pain not reported     Objective  See tx flow sheet; emphasis on dual tasking with dynamic balance and gait training          Assessment  Additional dual tasking completed throughout session today. With blaze pod SLS, pt attempts to count backwards by 3's from 100. Pt has to stop to think of next number instead of continuing to hit blaze pods due to increased cognitive load. Pt able to easily complete color catch with random call out of what hand and foot to hit pod with. Pt able to complete SLS while naming playing cards and matching to corresponding card on boad with less assist needed to appropriate name suit. Pt continues to have difficulty with counting backwards during obstacle course, however is able to continue walking through course without cues needed.    Goals (to be met in 10 visits)      Not Met Progress Toward Partially Met Met   Pt will demonstrate improved SLS to >30 seconds YURIDIA to promote safety and decrease risk of falls on uneven surfaces such as grass and gravel. [] [x] [] []   Pt will complete a 6MWT to determine baseline walking endurance.  [] [x] [] []   Pt will perform FGA with score of 28/30 or greater with least restrictive AD to demonstrate ability to ambulate safely in crowded and busy environments such as the grocery store. [] [x] [] []   Pt will be able to ambulate without path deviation while completing dual task to be able  to safely navigate community spaces without losing balance. [] [x] [] []   Pt will complete NB EC balance on complaint surface with minimal to no sway to safely be able to  shower without loss of balance.  [] [x] [] []   Pt will be independent and compliant with comprehensive HEP to maintain progress achieved in PT. [] [x] [] []                     Plan  Progress dynamic balance c cog activites.    Treatment Last 4 Visits  Treatment Day: 5 5/28/2025 6/4/2025 6/9/2025 6/12/2025   Neuro Treatment   Therapeutic Exercise 5 min biodex treadmill at 65 cm step length at 1.9 mph  15x posterior lunge 5 min biodex treadmill at 65 cm step length at 1.9 mph   15x posterior lunge w/ slider  15x lat lunge w/ slider   5 min biodex treadmill at 65 cm step length at 1.9 mph   DLP, #75, 3x15 5 min biodex treadmill at 65 cm step length at 1.8 mph   DLP, #75, 3x15   SLP, #50, 2x10 bilat     Neuro Re-Education \"Banana cat tree\" given at start of session, tested him on it 10 min later (0% correct without cuing associated yellow with banana).   to pegs, left and right hand, standing romberg on airex  2x20\" tandem on airex while reaching for and calling out name/suite of cards, R/L no HHA  10x step up to upside down bosu  2x30\"  romberg EC with tactile cues on spine.   Biodex:  Limits of stabilityx2 (level 6 and 8)  Maze control x2   \"Apple, dog, car\" given at start of session, tested him on it 10 min later (2/3 right after 2 attempts w/o cues, has to be told apple, at end of session names 2 w/o cues, 1 cue needed for apple but able to associate color)  10x step up to upside down bosu, 1 UE  10x lat step up to upside down bosu, no UE as able  Single leg, taps to cones, x10 bilat  2x45\" tandem on airex while reaching for and calling out name/suite of cards, R/L no HHA   2x30\"  romberg EC on airex with tactile cues on spine.   Biodex: w/ foam  Limits of stability x2 level 6   Maze control x2, level 6, BUE support  NB/half  tandem on airex, matching cards to board with verbal call out of suite and number (most difficulty differentiating between clubs and spades, able to complete correctly ~75% of time)   Blaze pods  - color catch, x3  - one leg balance 2x bilat  - color catch in plank position 2x  Tandem on airex, turning R/L to name objections on spot it cards  Tandem walk on airex beams, 3 laps  Side stepping on airex beams w/ ball toss, 3 laps    Blaze pods   - color catch, 3x30 sec with random call out for varying arm/leg  - one leg balance 2x30 sec bilat, second set counting backwards by 3's   - color catch in modified plank position 2x30 sec  SLS with card matches to board, verbally naming cards before placing, multiple bouts R/L   Tandem balance with ball toss to rebounder, 2x30 sec bilat     Gait Training  Walking with [employer] game, multiple bouts. Pt able to accurately pick appropriate female or male name for letter of the alphabet ~80% of time. With more difficult letters, often uses words that are not names, verbal cues to attempt to pick name  Walking with [employer] game, multiple bouts: able to identify appropriate foods for first category, difficulty with naming holidays in appropriate months, but can name months of children's birthday and his birthday     Walking with [employer] game, multiple bouts; The [employer] game naming animals   Obstacle course with cones, hurdles, step over bosu and tandem walking on airex, completed forward and side stepping, added dual task of counting backwards by 3's    Therapeutic Exercise Minutes 10 10 10 10   Neuro Re-Educ Minutes 35 20 21 20   Gait Training Minutes  10 10 13   Total Time Of Timed Procedures 45 40 41 43   Total Time Of Service-Based Procedures 0 0 0 0   Total Treatment Time 45 40 41 43   HEP Access Code: [email or web address]  URL: [email or web address]  Date: 05/28/2025  Prepared by: Allison Torres    Exercises  - Romberg Stance with Eyes Closed  - 1 x daily - 7 x weekly  - 3 sets - 30sec hold  - Tandem Walking  - 1 x daily - 7 x weekly - 3 sets  - Reverse Lunge  - 1 x daily - 7 x weekly - 3 sets - 10 reps  - Single Leg Stance  - 1 x daily - 7 x weekly - 3 sets - 20 sec hold           HEP  Access Code: [email or web address]  URL: [email or web address]  Date: 05/28/2025  Prepared by: Allison Torres    Exercises  - Romberg Stance with Eyes Closed  - 1 x daily - 7 x weekly - 3 sets - 30sec hold  - Tandem Walking  - 1 x daily - 7 x weekly - 3 sets  - Reverse Lunge  - 1 x daily - 7 x weekly - 3 sets - 10 reps  - Single Leg Stance  - 1 x daily - 7 x weekly - 3 sets - 20 sec hold    Charges  TE 1 NR 1 GT 1

## 2025-06-13 ENCOUNTER — OFFICE VISIT (OUTPATIENT)
Dept: OCCUPATIONAL MEDICINE | Facility: HOSPITAL | Age: 40
End: 2025-06-13
Payer: COMMERCIAL

## 2025-06-13 PROCEDURE — 97530 THERAPEUTIC ACTIVITIES: CPT

## 2025-06-13 NOTE — PROGRESS NOTES
Patient: Ender Aguirre (40 year old, male) Referring Provider:  Insurance:   Diagnosis: Cognitive disorder (F09)  Impaired mobility (Z74.09)  Intraparenchymal hematoma of brain without loss of consciousness, sequela (S06.330S) Physician Isaac BOWER   Date of Surgery: 5/8/2025 Next MD visit:  N/A   Precautions:  None n/a Referral Information:   Date of Injury: 5/7/2025 Date of Evaluation: Req: 0, Auth: 0, Exp:     05/22/25 POC Auth Visits:          Today's Date   6/13/2025    Subjective  When asked about changes in symptoms, the patient reports that his condition feels about the same. However, he notes he is becoming more accustomed to living with his current deficits.       Pain: 0/10     Objective  Patient engaged in a cognitive number puzzle in a new environment with increased sensory stimuli, including background music (auditory) and visual distractions such as windows and other patients in the space.    Orthotics: cervical brace     ROM and Strength:  (* denotes performed with pain)  Hand Strength (lbs) R L      62.8 76.6 lbs     2 pt Pinch 9 9     3 pt Pinch 15 16     Lateral pinch 15 19       Neurological:  Cognition:   Overall Cognitive Status: impaired  Arousal/Alertness: appropriate responses to stimuli  Attention Span: difficulty dividing attention  Orientation Level: oriented x4  Memory: impaired working memory  Initiation: appears intact  Motor Planning: intact  Perseveration: not present  Awareness of Errors: assistance required to identify errors made  Awareness of Deficits: decreased awareness of deficits (patient reports not understaning why he may be having difficulty with some cognitive processes)  Problem Solving: assistance required to generate solutions    [place] Cognitive Assessment: 19/30 with deficits in:  Visuospatial/executive function, attention, language, abstraction, and delayed recall.       ,   Vision: will be assessed at the following visit    Current Vision: other  Visual  History: brain injury  Patient Visual Report: blurring of print when reading  Ocular Range of Motion: restricted on the right  Head Position: -- (patient is in cervical brace)  Tracking:   tracked in all 4 quadrants smoothly    Saccades:  undershooting on the R side   Convergence:  breaks about 6 inches from nose  Acuity: able to read name tag with little difficulty.     ,   Perception:   Overall Perception Status: impaired  Left Right Discrimination: -- (R sided hemianopia)  Body Scheme: intact  Left Attention: intact  Right Attention: impaired   Visual Closure:  intact  Figure Ground:  intact  Depth Perception:  intact   Spatial Orientation:  intact  Visual Discrimination: R sided menianopia        Sensory: WNL  Spasticity: n/a    Coordination:   9 Hole Peg Test: R: 35.95 seconds   L: 19.99 seconds            Assessment  Patient is demonstrating positive adaptation and functional progress, even in the absence of reported symptom changes. His ability to complete cognitive tasks in a more stimulating environment reflects improved attention, focus, and tolerance to environmental demands. Notably, he completed the puzzle task with increased speed compared to previous sessions, suggesting improved cognitive processing and task familiarity. His growing adjustment to functional limitations also indicates increased self-awareness. Overall, the patient is doing well and shows good potential for continued improvement in cognitive function and environmental navigation.    Goals (to be met in 12 visits)   Pt will improve in 10 lbs in  strength to increase upper extremity strength, range of motion, and coordination to support daily tasks.    Pt will enhance fine motor skills for improved hand-eye coordination and object manipulation to support self-care tasks,   Pt will increase endurance and activity tolerance to 30 minutes with no breaks to support participation in ADL/IADL.    Pt will be independent and compliant with  comprehensive HEP to maintain progress achieved in OT.    Pt will improve sustained attention by independently completing a familiar task for 30 minutes with no breaks to support independence and success in activities like leisure and social participation.    Pt will initiate and complete a multi-step task, such as meal preparation or household chores by independently organizing and following a written or digital checklist.                Plan  Patient will be seen 1-2 x/week or a total of 12 visits over a 90 day period. Treatment will include: Therapeutic Activities; Self-Care Home Management; Neuromuscular Re-education; Therapeutic Exercise; Home Exercise Program instruction; Electrical stimulation (attended); Ultrasound; Manual Therapy    Treatment Last 4 Visits        5/28/2025 5/30/2025 6/9/2025 6/13/2025   OT Treatment   Treatment Day 2  3    Therapeutic Exercise Vision assessment   9-hole peg         Therapeutic Activity Sudoku   Blink  Sodoku (puzzle places on Right side to promote visual scanning to R side of visual field)   Perfection (puzzle places on Right side to promote visual scanning to R side of visual field)  Sodoku (puzzle places on Right side to promote visual scanning to R side of visual field)   Perfection (puzzle places on Right side to promote visual scanning to R side of visual field)  Sodoku   Perfection   [place]    Therapeutic Exercise Min 15      Ther Activity Min 30 45 45 45   Total Timed Procedures 45 45 45 45   Total Service Procedures 45 45 45 45   Total Time 45 45 45 45         HEP  Luminosity   Cognitive recovery strategies: daily orientation     Charges     TAx3

## 2025-06-16 ENCOUNTER — OFFICE VISIT (OUTPATIENT)
Dept: PHYSICAL THERAPY | Facility: HOSPITAL | Age: 40
End: 2025-06-16
Payer: COMMERCIAL

## 2025-06-16 PROCEDURE — 97110 THERAPEUTIC EXERCISES: CPT

## 2025-06-16 PROCEDURE — 97112 NEUROMUSCULAR REEDUCATION: CPT

## 2025-06-16 NOTE — PROGRESS NOTES
Patient: Ender Aguirre (40 year old, male) Referring Provider:  Insurance:   Diagnosis: Cognitive disorder (F09)  Impaired mobility (Z74.09)  Intraparenchymal hematoma of brain without loss of consciousness, sequela (S06.330S) Physician Alvatamalik BOWER   Date of Surgery: No data recorded Next MD visit:  N/A   Precautions:  Fall Risk No data recorded Referral Information:    Date of Evaluation: Req: 0, Auth: 0, Exp:     05/22/25 POC Auth Visits:  10       Today's Date   6/16/2025    Subjective  Pt is doing well, no changes or pain to report. [place] and felt like his vision was better on the R. Feels less unbalanced when walking around.       Pain: pain not reported     Objective  Pt able to answer math problems during color catch activity with ability to continue completing activity without stopping to have to answer math question         Assessment  Continued with dual tasking with various balance activities. Pt able to complete color catch with blaze pods with random call out of hitting with arm or leg. Progressed with completing math problem during activity with addition/subtraction/multiplication. Less hesitation with completing dual task than last session. Decreased reaction time noted when pods are in R visual field, however able to turn trunk to rotate and see pods without LOB. Pt instructed in HEP for at home and gym and discussed safe parameters to workout with for LE strengthening and jogging program.    Goals (to be met in 10 visits)      Not Met Progress Toward Partially Met Met   Pt will demonstrate improved SLS to >30 seconds YURIDIA to promote safety and decrease risk of falls on uneven surfaces such as grass and gravel. [] [x] [] []   Pt will complete a 6MWT to determine baseline walking endurance.  [] [x] [] []   Pt will perform FGA with score of 28/30 or greater with least restrictive AD to demonstrate ability to ambulate safely in crowded and busy environments such as the grocery  store. [] [x] [] []   Pt will be able to ambulate without path deviation while completing dual task to be able to safely navigate community spaces without losing balance. [] [x] [] []   Pt will complete NB EC balance on complaint surface with minimal to no sway to safely be able to  shower without loss of balance.  [] [x] [] []   Pt will be independent and compliant with comprehensive HEP to maintain progress achieved in PT. [] [x] [] []                         Plan  Progress dynamic balance c cog activites.    Treatment Last 4 Visits  Treatment Day: 6 6/4/2025 6/9/2025 6/12/2025 6/16/2025   Neuro Treatment   Therapeutic Exercise 5 min biodex treadmill at 65 cm step length at 1.9 mph   15x posterior lunge w/ slider  15x lat lunge w/ slider   5 min biodex treadmill at 65 cm step length at 1.9 mph   DLP, #75, 3x15 5 min biodex treadmill at 65 cm step length at 1.8 mph   DLP, #75, 3x15   SLP, #50, 2x10 bilat   Biodex treadmill, 6 min, 1.8 mph and 3% incline   Birddogs, x20   DLP, #87, 3x15   SLP, #62, x20 bilat   Hip extension on shuttle press, #50, 2x10 bilat  Pt education: HEP, safe things to do at the gym      Neuro Re-Education \"Apple, dog, car\" given at start of session, tested him on it 10 min later (2/3 right after 2 attempts w/o cues, has to be told apple, at end of session names 2 w/o cues, 1 cue needed for apple but able to associate color)  10x step up to upside down bosu, 1 UE  10x lat step up to upside down bosu, no UE as able  Single leg, taps to cones, x10 bilat  2x45\" tandem on airex while reaching for and calling out name/suite of cards, R/L no HHA   2x30\"  romberg EC on airex with tactile cues on spine.   Biodex: w/ foam  Limits of stability x2 level 6   Maze control x2, level 6, BUE support  NB/half tandem on airex, matching cards to board with verbal call out of suite and number (most difficulty differentiating between clubs and spades, able to complete correctly ~75% of time)   Blaze  pods  - color catch, x3  - one leg balance 2x bilat  - color catch in plank position 2x  Tandem on airex, turning R/L to name objections on spot it cards  Tandem walk on airex beams, 3 laps  Side stepping on airex beams w/ ball toss, 3 laps    Blaze pods   - color catch, 3x30 sec with random call out for varying arm/leg  - one leg balance 2x30 sec bilat, second set counting backwards by 3's   - color catch in modified plank position 2x30 sec  SLS with card matches to board, verbally naming cards before placing, multiple bouts R/L   Tandem balance with ball toss to rebounder, 2x30 sec bilat   Blaze pods   - color catch, 4x30 sec with dual task of math problems   - one leg balance 2x30 sec bilat, arm hitting pod  BOSU squats, 2x15  Tandem stance on foam with taps to ski poles, 2x40 sec bilat       Gait Training Walking with alphabet game, multiple bouts. Pt able to accurately pick appropriate female or male name for letter of the alphabet ~80% of time. With more difficult letters, often uses words that are not names, verbal cues to attempt to pick name  Walking with [employer] game, multiple bouts: able to identify appropriate foods for first category, difficulty with naming holidays in appropriate months, but can name months of children's birthday and his birthday     Walking with [employer] game, multiple bouts; [place] game naming animals   Obstacle course with cones, hurdles, step over bosu and tandem walking on airex, completed forward and side stepping, added dual task of counting backwards by 3's     Therapeutic Exercise Minutes 10 10 10 25   Neuro Re-Educ Minutes 20 21 20 15   Gait Training Minutes 10 10 13    Total Time Of Timed Procedures 40 41 43 40   Total Time Of Service-Based Procedures 0 0 0 0   Total Treatment Time 40 41 43 40   HEP    Access Code: [email or web address]  URL: [email or web address]  Date: 06/16/2025  Prepared by: Minnie Davis    Exercises  - Side Stepping with Resistance at Ankles  -  1 x daily - 7 x weekly - 2 sets - 10 reps  - Forward Monster Walks  - 1 x daily - 7 x weekly - 2 sets - 10 reps  - Backward Monster Walks  - 1 x daily - 7 x weekly - 2 sets - 10 reps  - Single Leg Cone Pick-Up  - 1 x daily - 7 x weekly - 2 sets - 10 reps  - Tandem Walking with Counter Support  - 1 x daily - 7 x weekly - 3 sets - 10 reps        HEP  Access Code: [email or web address]  URL: [email or web address]  Date: 06/16/2025  Prepared by: Minnie Davis    Exercises  - Side Stepping with Resistance at Ankles  - 1 x daily - 7 x weekly - 2 sets - 10 reps  - Forward Monster Walks  - 1 x daily - 7 x weekly - 2 sets - 10 reps  - Backward Monster Walks  - 1 x daily - 7 x weekly - 2 sets - 10 reps  - Single Leg Cone Pick-Up  - 1 x daily - 7 x weekly - 2 sets - 10 reps  - Tandem Walking with Counter Support  - 1 x daily - 7 x weekly - 3 sets - 10 reps    Charges  TE 2 NR 1

## 2025-06-18 ENCOUNTER — OFFICE VISIT (OUTPATIENT)
Dept: OCCUPATIONAL MEDICINE | Facility: HOSPITAL | Age: 40
End: 2025-06-18
Payer: COMMERCIAL

## 2025-06-18 ENCOUNTER — APPOINTMENT (OUTPATIENT)
Dept: OCCUPATIONAL MEDICINE | Facility: HOSPITAL | Age: 40
End: 2025-06-18
Payer: COMMERCIAL

## 2025-06-18 PROCEDURE — 97530 THERAPEUTIC ACTIVITIES: CPT

## 2025-06-18 NOTE — PROGRESS NOTES
Patient: Ender Aguirre (40 year old, male) Referring Provider:  Insurance:   Diagnosis: Cognitive disorder (F09)  Impaired mobility (Z74.09)  Intraparenchymal hematoma of brain without loss of consciousness, sequela (S06.330S) Physician Isaac BOWER   Date of Surgery: 5/8/2025 Next MD visit:  N/A   Precautions:  None n/a Referral Information:   Date of Injury: 5/7/2025 Date of Evaluation: Req: 0, Auth: 0, Exp:     05/22/25 POC Auth Visits:          Today's Date   6/18/2025    Subjective  Patient presents to OT reporting that he feels much closer to “normal” than he has in the past. He states he is feeling more like himself and is more motivated to engage in daily activities.       Pain: 0/10     Objective  Patient presents to OT reporting that he feels much closer to “normal” than he has in the past. He states he is feeling more like himself and is more motivated to engage in daily activities.    Orthotics: cervical brace     ROM and Strength:  (* denotes performed with pain)  Hand Strength (lbs) R L      62.8 76.6 lbs     2 pt Pinch 9 9     3 pt Pinch 15 16     Lateral pinch 15 19       Neurological:  Cognition:   Overall Cognitive Status: impaired  Arousal/Alertness: appropriate responses to stimuli  Attention Span: difficulty dividing attention  Orientation Level: oriented x4  Memory: impaired working memory  Initiation: appears intact  Motor Planning: intact  Perseveration: not present  Awareness of Errors: assistance required to identify errors made  Awareness of Deficits: decreased awareness of deficits (patient reports not understaning why he may be having difficulty with some cognitive processes)  Problem Solving: assistance required to generate solutions    [place] Cognitive Assessment: 19/30 with deficits in:  Visuospatial/executive function, attention, language, abstraction, and delayed recall.       ,   Vision: will be assessed at the following visit    Current Vision: other  Visual History: brain  injury  Patient Visual Report: blurring of print when reading  Ocular Range of Motion: restricted on the right  Head Position: -- (patient is in cervical brace)  Tracking:   tracked in all 4 quadrants smoothly    Saccades:  undershooting on the R side   Convergence:  breaks about 6 inches from nose  Acuity: able to read name tag with little difficulty.     ,   Perception:   Overall Perception Status: impaired  Left Right Discrimination: -- (R sided hemianopia)  Body Scheme: intact  Left Attention: intact  Right Attention: impaired   Visual Closure:  intact  Figure Ground:  intact  Depth Perception:  intact   Spatial Orientation:  intact  Visual Discrimination: R sided menianopia        Sensory: WNL  Spasticity: n/a    Coordination:   9 Hole Peg Test: R: 35.95 seconds   L: 19.99 seconds          Assessment  Patient demonstrated good insight and performance during the session. He did not require any cuing to scan toward the right side, indicating improved visual attention and awareness. His increased motivation and engagement in tasks reflect cognitive and emotional improvements that support continued functional progress.    Goals (to be met in 12 visits)   Pt will improve in 10 lbs in  strength to increase upper extremity strength, range of motion, and coordination to support daily tasks.    Pt will enhance fine motor skills for improved hand-eye coordination and object manipulation to support self-care tasks,   Pt will increase endurance and activity tolerance to 30 minutes with no breaks to support participation in ADL/IADL.    Pt will be independent and compliant with comprehensive HEP to maintain progress achieved in OT.    Pt will improve sustained attention by independently completing a familiar task for 30 minutes with no breaks to support independence and success in activities like leisure and social participation.    Pt will initiate and complete a multi-step task, such as meal preparation or household  chores by independently organizing and following a written or digital checklist.                  Plan  Patient will be seen 1-2 x/week or a total of 12 visits over a 90 day period. Treatment will include: Therapeutic Activities; Self-Care Home Management; Neuromuscular Re-education; Therapeutic Exercise; Home Exercise Program instruction; Electrical stimulation (attended); Ultrasound; Manual Therapy    Treatment Last 4 Visits        5/30/2025 6/9/2025 6/13/2025 6/18/2025   OT Treatment   Treatment Day  3     Therapeutic Activity Sodoku (puzzle places on Right side to promote visual scanning to R side of visual field)   Perfection (puzzle places on Right side to promote visual scanning to R side of visual field)  Sodoku (puzzle places on Right side to promote visual scanning to R side of visual field)   Perfection (puzzle places on Right side to promote visual scanning to R side of visual field)  Sodoku   Perfection   [place]  Sodoku (medium 23 minutes)   Perfection   Operation [place]   Blink   Ther Activity Min 45 45 45 40   Total Timed Procedures 45 45 45 40   Total Service Procedures 45 45 45 40   Total Time 45 45 45 40         HEP  Luminosity   Cognitive recovery strategies: daily orientation     Charges     TAx3

## 2025-06-20 ENCOUNTER — APPOINTMENT (OUTPATIENT)
Dept: OCCUPATIONAL MEDICINE | Facility: HOSPITAL | Age: 40
End: 2025-06-20
Payer: COMMERCIAL

## 2025-06-20 ENCOUNTER — OFFICE VISIT (OUTPATIENT)
Dept: PHYSICAL THERAPY | Facility: HOSPITAL | Age: 40
End: 2025-06-20
Payer: COMMERCIAL

## 2025-06-20 PROCEDURE — 97112 NEUROMUSCULAR REEDUCATION: CPT

## 2025-06-20 PROCEDURE — 97110 THERAPEUTIC EXERCISES: CPT

## 2025-06-20 NOTE — PROGRESS NOTES
Patient: Ender Aguirre (40 year old, male) Referring Provider:  Insurance:   Diagnosis: Cognitive disorder (F09)  Impaired mobility (Z74.09)  Intraparenchymal hematoma of brain without loss of consciousness, sequela (S06.330S) Physician Isaac BOWRE   Date of Surgery: No data recorded Next MD visit:  N/A   Precautions:  Fall Risk No data recorded Referral Information:    Date of Evaluation: Req: 0, Auth: 0, Exp:     05/22/25 POC Auth Visits:  10       Today's Date   6/20/2025    Subjective  Pt reports he will be returning to work next week. He works from home and they will be accomdating him as needed until he is able to fully return at previous capacity. Following up with neuro opthomology soon too.       Pain: pain not reported     Objective       None taken today.      Assessment  Good progress with dynamic strength and balance. Pt continues to struggle to attend to objects on R side, such as blaze pods with SLS activity. Pt has increased response time needed to hit objects on right side of body. Pt able to complete SLS on BOSU with tosses of bean bags into crate with good stability. Ocassionally needs min UE support to prevent loss of balance. Discussed with pt remainder of schedule, will continue PT 1x per week or every other week to work on dual tasking and dynamic balance for return to PLOF.    Goals (to be met in 10 visits)      Not Met Progress Toward Partially Met Met   Pt will demonstrate improved SLS to >30 seconds YURIDIA to promote safety and decrease risk of falls on uneven surfaces such as grass and gravel. [] [x] [] []   Pt will complete a 6MWT to determine baseline walking endurance.  [] [x] [] []   Pt will perform FGA with score of 28/30 or greater with least restrictive AD to demonstrate ability to ambulate safely in crowded and busy environments such as the grocery store. [] [x] [] []   Pt will be able to ambulate without path deviation while completing dual task to be able to safely navigate  community spaces without losing balance. [] [x] [] []   Pt will complete NB EC balance on complaint surface with minimal to no sway to safely be able to  shower without loss of balance.  [] [x] [] []   Pt will be independent and compliant with comprehensive HEP to maintain progress achieved in PT. [] [x] [] []                             Plan  Progress dynamic balance c cog activites.    Treatment Last 4 Visits  Treatment Day: 7 6/9/2025 6/12/2025 6/16/2025 6/20/2025   Neuro Treatment   Therapeutic Exercise 5 min biodex treadmill at 65 cm step length at 1.9 mph   DLP, #75, 3x15 5 min biodex treadmill at 65 cm step length at 1.8 mph   DLP, #75, 3x15   SLP, #50, 2x10 bilat   Biodex treadmill, 6 min, 1.8 mph and 3% incline   Birddogs, x20   DLP, #87, 3x15   SLP, #62, x20 bilat   Hip extension on shuttle press, #50, 2x10 bilat  Pt education: HEP, safe things to do at the gym    Elliptical, 6 min, level 4  Monster walks with green band, fwd/ bkwd, 3 laps   Pt education: continue to progress gym activities as tolerated     Neuro Re-Education Blaze pods  - color catch, x3  - one leg balance 2x bilat  - color catch in plank position 2x  Tandem on airex, turning R/L to name objections on spot it cards  Tandem walk on airex beams, 3 laps  Side stepping on airex beams w/ ball toss, 3 laps    Blaze pods   - color catch, 3x30 sec with random call out for varying arm/leg  - one leg balance 2x30 sec bilat, second set counting backwards by 3's   - color catch in modified plank position 2x30 sec  SLS with card matches to board, verbally naming cards before placing, multiple bouts R/L   Tandem balance with ball toss to rebounder, 2x30 sec bilat   Blaze pods   - color catch, 4x30 sec with dual task of math problems   - one leg balance 2x30 sec bilat, arm hitting pod  BOSU squats, 2x15  Tandem stance on foam with taps to ski poles, 2x40 sec bilat     Blaze pods   - Random color with distraction, hitting with legs then  with hands, multiple bouts  - one leg balance on airex 2x30 sec bilat   Step up to BOSU, with opp arm OH press and knee drive 5# weight, x10 bilat  SLS on BOSU (blue down): holding bean bag on foot and tossing into basket  Tandem on airex, ball toss to rebounder, multiple bouts bilat, throwing with L and right hands     Gait Training Walking with [employer] game, multiple bouts: able to identify appropriate foods for first category, difficulty with naming holidays in appropriate months, but can name months of children's birthday and his birthday     Walking with [employer] game, multiple bouts; [employer] game naming animals   Obstacle course with cones, hurdles, step over bosu and tandem walking on airex, completed forward and side stepping, added dual task of counting backwards by 3's      Therapeutic Exercise Minutes 10 10 25 10   Neuro Re-Educ Minutes 21 20 15 25   Gait Training Minutes 10 13     Total Time Of Timed Procedures 41 43 40 35   Total Time Of Service-Based Procedures 0 0 0 0   Total Treatment Time 41 43 40 35   HEP   Access Code: [email or web address]  URL: [email or web address]  Date: 06/16/2025  Prepared by: Minnie Davis    Exercises  - Side Stepping with Resistance at Ankles  - 1 x daily - 7 x weekly - 2 sets - 10 reps  - Forward Monster Walks  - 1 x daily - 7 x weekly - 2 sets - 10 reps  - Backward Monster Walks  - 1 x daily - 7 x weekly - 2 sets - 10 reps  - Single Leg Cone Pick-Up  - 1 x daily - 7 x weekly - 2 sets - 10 reps  - Tandem Walking with Counter Support  - 1 x daily - 7 x weekly - 3 sets - 10 reps         HEP  Access Code: [email or web address]  URL: [email or web address]  Date: 06/16/2025  Prepared by: Minnie Davis    Exercises  - Side Stepping with Resistance at Ankles  - 1 x daily - 7 x weekly - 2 sets - 10 reps  - Forward Monster Walks  - 1 x daily - 7 x weekly - 2 sets - 10 reps  - Backward Monster Walks  - 1 x daily - 7 x weekly - 2 sets - 10 reps  - Single Leg Cone  Pick-Up  - 1 x daily - 7 x weekly - 2 sets - 10 reps  - Tandem Walking with Counter Support  - 1 x daily - 7 x weekly - 3 sets - 10 reps    Charges  NR 2 TE 1

## 2025-06-23 ENCOUNTER — OFFICE VISIT (OUTPATIENT)
Dept: OCCUPATIONAL MEDICINE | Facility: HOSPITAL | Age: 40
End: 2025-06-23
Payer: COMMERCIAL

## 2025-06-23 PROCEDURE — 97530 THERAPEUTIC ACTIVITIES: CPT

## 2025-06-23 NOTE — PROGRESS NOTES
Patient: Ender Aguirre (40 year old, male) Referring Provider:  Insurance:   Diagnosis: Cognitive disorder (F09)  Impaired mobility (Z74.09)  Intraparenchymal hematoma of brain without loss of consciousness, sequela (S06.330S) Physician Isaac BOWER   Date of Surgery: 5/8/2025 Next MD visit:  N/A   Precautions:  None n/a Referral Information:   Date of Injury: 5/7/2025 Date of Evaluation: Req: 0, Auth: 0, Exp:     05/22/25 POC Auth Visits:          Today's Date   6/23/2025    Subjective  Patient presents to OT reporting that he returned to work today and felt it went well overall. He noted some difficulty recalling how to complete certain tasks but stated that with a reminder of the first step or additional time to problem-solve, he was able to complete them successfully. He also inquired about strategies to support his vision at home.       Pain: 0/10     Objective  Patient participated in cognitive exercises with all materials placed on the right side to support right-sided visual scanning and attention.      Orthotics: cervical brace     ROM and Strength:  (* denotes performed with pain)  Hand Strength (lbs) R L      62.8 76.6 lbs     2 pt Pinch 9 9     3 pt Pinch 15 16     Lateral pinch 15 19       Neurological:  Cognition:   Overall Cognitive Status: impaired  Arousal/Alertness: appropriate responses to stimuli  Attention Span: difficulty dividing attention  Orientation Level: oriented x4  Memory: impaired working memory  Initiation: appears intact  Motor Planning: intact  Perseveration: not present  Awareness of Errors: assistance required to identify errors made  Awareness of Deficits: decreased awareness of deficits (patient reports not understaning why he may be having difficulty with some cognitive processes)  Problem Solving: assistance required to generate solutions    [place] Cognitive Assessment: 19/30 with deficits in:  Visuospatial/executive function, attention, language, abstraction, and  delayed recall.       ,   Vision: will be assessed at the following visit    Current Vision: other  Visual History: brain injury  Patient Visual Report: blurring of print when reading  Ocular Range of Motion: restricted on the right  Head Position: -- (patient is in cervical brace)  Tracking:   tracked in all 4 quadrants smoothly    Saccades:  undershooting on the R side   Convergence:  breaks about 6 inches from nose  Acuity: able to read name tag with little difficulty.     ,   Perception:   Overall Perception Status: impaired  Left Right Discrimination: -- (R sided hemianopia)  Body Scheme: intact  Left Attention: intact  Right Attention: impaired   Visual Closure:  intact  Figure Ground:  intact  Depth Perception:  intact   Spatial Orientation:  intact  Visual Discrimination: R sided menianopia        Sensory: WNL  Spasticity: n/a    Coordination:   9 Hole Peg Test: R: 35.95 seconds   L: 19.99 seconds          Assessment  Patient demonstrates strong motivation for continued recovery, as evidenced by his questions regarding visual strategies and work performance. His ability to problem-solve and complete tasks with minimal prompting is a positive indicator of functional cognitive recovery. Continued focus on visual-perceptual training and compensatory strategies will support his return to independence and successful work reintegration.    Goals (to be met in 12 visits)   Pt will improve in 10 lbs in  strength to increase upper extremity strength, range of motion, and coordination to support daily tasks.    Pt will enhance fine motor skills for improved hand-eye coordination and object manipulation to support self-care tasks,   Pt will increase endurance and activity tolerance to 30 minutes with no breaks to support participation in ADL/IADL.    Pt will be independent and compliant with comprehensive HEP to maintain progress achieved in OT.    Pt will improve sustained attention by independently completing a  familiar task for 30 minutes with no breaks to support independence and success in activities like leisure and social participation.    Pt will initiate and complete a multi-step task, such as meal preparation or household chores by independently organizing and following a written or digital checklist.                    Plan  Patient will be seen 1-2 x/week or a total of 12 visits over a 90 day period. Treatment will include: Therapeutic Activities; Self-Care Home Management; Neuromuscular Re-education; Therapeutic Exercise; Home Exercise Program instruction; Electrical stimulation (attended); Ultrasound; Manual Therapy    Treatment Last 4 Visits        6/9/2025 6/13/2025 6/18/2025 6/23/2025   OT Treatment   Treatment Day 3   6   Therapeutic Activity Sodoku (puzzle places on Right side to promote visual scanning to R side of visual field)   Perfection (puzzle places on Right side to promote visual scanning to R side of visual field)  Sodoku   Perfection   [place]  Sodoku (medium 23 minutes)   Perfection   Operation [place]   Blink Sodoku (medium 17 minutes)   Perfection  Operation dinosaur    Ther Activity Min 45 45 40 45   Total Timed Procedures 45 45 40 45   Total Service Procedures 45 45 40 45   Total Time 45 45 40 45         HEP  Luminosity   Cognitive recovery strategies: daily orientation    Charges     TAx3

## 2025-06-25 ENCOUNTER — APPOINTMENT (OUTPATIENT)
Dept: PHYSICAL THERAPY | Facility: HOSPITAL | Age: 40
End: 2025-06-25
Payer: COMMERCIAL

## 2025-06-26 ENCOUNTER — OFFICE VISIT (OUTPATIENT)
Dept: OCCUPATIONAL MEDICINE | Facility: HOSPITAL | Age: 40
End: 2025-06-26
Payer: COMMERCIAL

## 2025-06-26 PROCEDURE — 97530 THERAPEUTIC ACTIVITIES: CPT

## 2025-06-26 NOTE — PROGRESS NOTES
Patient: Ender Aguirre (40 year old, male) Referring Provider:  Insurance:   Diagnosis: Cognitive disorder (F09)  Impaired mobility (Z74.09)  Intraparenchymal hematoma of brain without loss of consciousness, sequela (S06.330S) Physician Isaac BOWER   Date of Surgery: 5/8/2025 Next MD visit:  N/A   Precautions:  None n/a Referral Information:   Date of Injury: 5/7/2025 Date of Evaluation: Req: 0, Auth: 0, Exp:     05/22/25 POC Auth Visits:          Today's Date   6/26/2025    Subjective  Patient reports that his vision appeared slightly clearer today compared to previous sessions. He notes that his right visual field seems to be improving and opening up more. He also states that he feels his cognitive deficits have resolved and that his primary challenges now stem from visual impairments.       Pain: 0/10     Objective  Patient participated in a [employer] puzzle activity, which was selected at an easier level to allow focus on right-sided visual attention. The worksheet was positioned to the right to encourage scanning and awareness. Patient made one error, missing a number located on the far right of the puzzle, but independently recognized and corrected the mistake without prompting.    Orthotics: cervical brace     ROM and Strength:  (* denotes performed with pain)  Hand Strength (lbs) R L      62.8 76.6 lbs     2 pt Pinch 9 9     3 pt Pinch 15 16     Lateral pinch 15 19       Neurological:  Cognition:   Overall Cognitive Status: impaired  Arousal/Alertness: appropriate responses to stimuli  Attention Span: difficulty dividing attention  Orientation Level: oriented x4  Memory: impaired working memory  Initiation: appears intact  Motor Planning: intact  Perseveration: not present  Awareness of Errors: assistance required to identify errors made  Awareness of Deficits: decreased awareness of deficits (patient reports not understaning why he may be having difficulty with some cognitive processes)  Problem  Solving: assistance required to generate solutions    [place] Cognitive Assessment: 19/30 with deficits in:  Visuospatial/executive function, attention, language, abstraction, and delayed recall.       ,   Vision: will be assessed at the following visit    Current Vision: other  Visual History: brain injury  Patient Visual Report: blurring of print when reading  Ocular Range of Motion: restricted on the right  Head Position: -- (patient is in cervical brace)  Tracking:   tracked in all 4 quadrants smoothly    Saccades:  undershooting on the R side   Convergence:  breaks about 6 inches from nose  Acuity: able to read name tag with little difficulty.     ,   Perception:   Overall Perception Status: impaired  Left Right Discrimination: -- (R sided hemianopia)  Body Scheme: intact  Left Attention: intact  Right Attention: impaired   Visual Closure:  intact  Figure Ground:  intact  Depth Perception:  intact   Spatial Orientation:  intact  Visual Discrimination: R sided menianopia        Sensory: WNL  Spasticity: n/a    Coordination:   9 Hole Peg Test: R: 35.95 seconds   L: 19.99 seconds          Assessment  Patient demonstrates improved insight and independence in managing visual deficits. The ability to self-identify and correct errors indicates progress in visual awareness and sustained attention. Continued focus on right visual field training is recommended to support functional reintegration of visual processing skills.    Goals (to be met in 12 visits)   Pt will improve in 10 lbs in  strength to increase upper extremity strength, range of motion, and coordination to support daily tasks.    Pt will enhance fine motor skills for improved hand-eye coordination and object manipulation to support self-care tasks,   Pt will increase endurance and activity tolerance to 30 minutes with no breaks to support participation in ADL/IADL.    Pt will be independent and compliant with comprehensive HEP to maintain progress  achieved in OT.    Pt will improve sustained attention by independently completing a familiar task for 30 minutes with no breaks to support independence and success in activities like leisure and social participation.    Pt will initiate and complete a multi-step task, such as meal preparation or household chores by independently organizing and following a written or digital checklist.                      Plan  Patient will be seen 1-2 x/week or a total of 12 visits over a 90 day period. Treatment will include: Therapeutic Activities; Self-Care Home Management; Neuromuscular Re-education; Therapeutic Exercise; Home Exercise Program instruction; Electrical stimulation (attended); Ultrasound; Manual Therapy    Treatment Last 4 Visits        6/13/2025 6/18/2025 6/23/2025 6/26/2025   OT Treatment   Treatment Day   6 7   Therapeutic Activity Sodoku   Perfection   [place]  Sodoku (medium 23 minutes)   Perfection   Operation [place]   Blink Sodoku (medium 17 minutes)   Perfection  Operation dinosaur  Sodoku (medium 17 minutes)   Perfection  Operation dinosaur    Ther Activity Min 45 40 45 40   Total Timed Procedures 45 40 45 40   Total Service Procedures 45 40 45 40   Total Time 45 40 45 40         HEP  Luminosity   Cognitive recovery strategies: daily orientation    Charges     TAx3

## 2025-06-27 ENCOUNTER — APPOINTMENT (OUTPATIENT)
Dept: PHYSICAL THERAPY | Facility: HOSPITAL | Age: 40
End: 2025-06-27
Payer: COMMERCIAL

## 2025-06-30 ENCOUNTER — OFFICE VISIT (OUTPATIENT)
Dept: OCCUPATIONAL MEDICINE | Facility: HOSPITAL | Age: 40
End: 2025-06-30
Payer: COMMERCIAL

## 2025-06-30 ENCOUNTER — APPOINTMENT (OUTPATIENT)
Dept: OCCUPATIONAL MEDICINE | Facility: HOSPITAL | Age: 40
End: 2025-06-30
Payer: COMMERCIAL

## 2025-06-30 ENCOUNTER — TELEPHONE (OUTPATIENT)
Dept: PHYSICAL THERAPY | Facility: HOSPITAL | Age: 40
End: 2025-06-30

## 2025-06-30 PROCEDURE — 97530 THERAPEUTIC ACTIVITIES: CPT

## 2025-06-30 NOTE — PROGRESS NOTES
Patient: Ender Aguirre (40 year old, male) Referring Provider:  Insurance:   Diagnosis: Cognitive disorder (F09)  Impaired mobility (Z74.09)  Intraparenchymal hematoma of brain without loss of consciousness, sequela (S06.330S) Jerson Somers  [facility]   Date of Surgery: 5/8/2025 Next MD visit:  N/A   Precautions:  None n/a Referral Information:   Date of Injury: 5/7/2025 Date of Evaluation: Req: 0, Auth: 0, Exp:     05/22/25 POC Auth Visits:          Today's Date   6/30/2025    Subjective  Patient presents to OT reporting continued improvements in visual function. He notes that he is adjusting responsibilities at home to ensure tasks are completed safely and effectively while his vision continues to recover--for example, modifying how he supervises his children when outside.       Pain: 0/10     Objective  Patient engaged in a Sudoku puzzle and a visual scanning task requiring attention to the right visual field. He completed both tasks with minimal assistance.    Orthotics: cervical brace     ROM and Strength:  (* denotes performed with pain)  Hand Strength (lbs) R L      62.8 76.6 lbs     2 pt Pinch 9 9     3 pt Pinch 15 16     Lateral pinch 15 19       Neurological:  Cognition:   Overall Cognitive Status: impaired  Arousal/Alertness: appropriate responses to stimuli  Attention Span: difficulty dividing attention  Orientation Level: oriented x4  Memory: impaired working memory  Initiation: appears intact  Motor Planning: intact  Perseveration: not present  Awareness of Errors: assistance required to identify errors made  Awareness of Deficits: decreased awareness of deficits (patient reports not understaning why he may be having difficulty with some cognitive processes)  Problem Solving: assistance required to generate solutions    [place] Cognitive Assessment: 19/30 with deficits in:  Visuospatial/executive function, attention, language, abstraction, and delayed recall.       ,   Vision: will be assessed at the  following visit    Current Vision: other  Visual History: brain injury  Patient Visual Report: blurring of print when reading  Ocular Range of Motion: restricted on the right  Head Position: -- (patient is in cervical brace)  Tracking:   tracked in all 4 quadrants smoothly    Saccades:  undershooting on the R side   Convergence:  breaks about 6 inches from nose  Acuity: able to read name tag with little difficulty.     ,   Perception:   Overall Perception Status: impaired  Left Right Discrimination: -- (R sided hemianopia)  Body Scheme: intact  Left Attention: intact  Right Attention: impaired   Visual Closure:  intact  Figure Ground:  intact  Depth Perception:  intact   Spatial Orientation:  intact  Visual Discrimination: R sided menianopia        Sensory: WNL  Spasticity: n/a    Coordination:   9 Hole Peg Test: R: 35.95 seconds   L: 19.99 seconds          Assessment  Patient is demonstrating strong insight into his condition, as evidenced by his ability to draw connections between OT activities and real-life applications. He also shows good self-management skills by appropriately modifying tasks at home to support safety and function. Overall, he is progressing well.    Goals (to be met in 12 visits)   Pt will improve in 10 lbs in  strength to increase upper extremity strength, range of motion, and coordination to support daily tasks.    Pt will enhance fine motor skills for improved hand-eye coordination and object manipulation to support self-care tasks,   Pt will increase endurance and activity tolerance to 30 minutes with no breaks to support participation in ADL/IADL.    Pt will be independent and compliant with comprehensive HEP to maintain progress achieved in OT.    Pt will improve sustained attention by independently completing a familiar task for 30 minutes with no breaks to support independence and success in activities like leisure and social participation.    Pt will initiate and complete a  multi-step task, such as meal preparation or household chores by independently organizing and following a written or digital checklist.                        Plan  Patient will be seen 1-2 x/week or a total of 12 visits over a 90 day period. Treatment will include: Therapeutic Activities; Self-Care Home Management; Neuromuscular Re-education; Therapeutic Exercise; Home Exercise Program instruction; Electrical stimulation (attended); Ultrasound; Manual Therapy    Treatment Last 4 Visits        6/18/2025 6/23/2025 6/26/2025 6/30/2025   OT Treatment   Treatment Day  6 7 8   Therapeutic Activity Sodoku (medium 23 minutes)   Perfection   Operation [place]   Blink Sodoku (medium 17 minutes)   Perfection  Operation dinosaur  Sodoku (medium 17 minutes)   Perfection  Operation dinosaur  Sodoku   Card matching(R side)   Ther Activity Min 40 45 40 45   Total Timed Procedures 40 45 40 45   Total Service Procedures 40 45 40 45   Total Time 40 45 40 45         HEP  Luminosity   Cognitive recovery strategies: daily orientation    Charges     TAx3

## 2025-07-01 ENCOUNTER — APPOINTMENT (OUTPATIENT)
Dept: PHYSICAL THERAPY | Facility: HOSPITAL | Age: 40
End: 2025-07-01
Payer: COMMERCIAL

## 2025-07-03 ENCOUNTER — OFFICE VISIT (OUTPATIENT)
Dept: OCCUPATIONAL MEDICINE | Facility: HOSPITAL | Age: 40
End: 2025-07-03
Payer: COMMERCIAL

## 2025-07-03 PROCEDURE — 97110 THERAPEUTIC EXERCISES: CPT

## 2025-07-03 NOTE — PROGRESS NOTES
Progress Summary  Pt has attended 9 visits in Occupational Therapy.      Patient: Ender Aguirre (40 year old, male) Referring Provider:  Insurance:   Diagnosis: Cognitive disorder (F09)  Impaired mobility (Z74.09)  Intraparenchymal hematoma of brain without loss of consciousness, sequela (S06.330S) Physician Isaac BOWER   Date of Surgery: 5/8/2025 Next MD visit:  N/A   Precautions:  None n/a Referral Information:   Date of Injury: 5/7/2025 Date of Evaluation: Req: 0, Auth: 0, Exp:     05/22/25 POC Auth Visits:          Today's Date   7/3/2025    Subjective  Patient presents to OT reporting that he feels he is recovering well, noting small but meaningful improvements when evaluating his symptoms on a weekly basis. He shared that he recently went on a long drive and noticed decreased endurance while driving, leading to concerns about safety due to fatigue.       Pain: 0/10     Objective  Patient demonstrates notable improvements in cognition, including areas such as language and visuospatial/executive functioning. Upper extremity strength has also significantly improved. [place] Cognitive Assessment (MoCA) score increased from 19/30 to 23/30. Improvements were also observed in 9-Hole Peg Test performance with the right hand.      PROGRESS  Orthotics: n/a     ROM and Strength:  (* denotes performed with pain)  Hand Strength (lbs) R L      105.6 lbs  103.4 lbs     2 pt Pinch 15 12     3 pt Pinch 18 19     Lateral pinch 21 21       Neurological:  Cognition:   Overall Cognitive Status: impaired  Arousal/Alertness: appropriate responses to stimuli  Attention Span: difficulty dividing attention  Orientation Level: oriented x4  Memory: impaired working memory  Initiation: appears intact  Motor Planning: intact  Perseveration: not present  Awareness of Errors: assistance required to identify errors made  Awareness of Deficits: aware of deficits  Safety Judgment: decreased awareness of need for safety   Problem  Solving: assistance required to identify errors made     [place] Cognitive Assessment: 23/30 with deficits in:   Attention, abstraction, and delayed recall.       ,   Vision:    Current Vision: other  Visual History: brain injury  Patient Visual Report: blurring of print when reading  Ocular Range of Motion: restricted on the right  Head Position: --  Tracking:   tracked in all 4 quadrants smoothly    Saccades:  undershooting on the R side   Convergence:  breaks about 6 inches from nose  Acuity: able to read name tag with little difficulty.     ,   Perception:   Overall Perception Status: impaired  Left Right Discrimination: -- (R sided hemianopia)  Body Scheme: intact  Left Attention: intact  Right Attention: impaired   Visual Closure:  intact  Figure Ground:  intact  Depth Perception:  intact   Spatial Orientation:  intact  Visual Discrimination: R sided Hemianopia        Coordination:   9 Hole Peg Test: R: 23.73 seconds   L: 20.01 seconds       EVALUATION  Orthotics: cervical brace     ROM and Strength:  (* denotes performed with pain)  Hand Strength (lbs) R L      62.8 76.6 lbs     2 pt Pinch 9 9     3 pt Pinch 15 16     Lateral pinch 15 19       Neurological:  Cognition:   Overall Cognitive Status: impaired  Arousal/Alertness: appropriate responses to stimuli  Attention Span: difficulty dividing attention  Orientation Level: oriented x4  Memory: impaired working memory  Initiation: appears intact  Motor Planning: intact  Perseveration: not present  Awareness of Errors: assistance required to identify errors made  Awareness of Deficits: decreased awareness of deficits (patient reports not understaning why he may be having difficulty with some cognitive processes)  Problem Solving: assistance required to generate solutions    [place] Cognitive Assessment: 19/30 with deficits in:  Visuospatial/executive function, attention, language, abstraction, and delayed recall.       ,   Vision: will be assessed at the  following visit    Current Vision: other  Visual History: brain injury  Patient Visual Report: blurring of print when reading  Ocular Range of Motion: restricted on the right  Head Position: -- (patient is in cervical brace)  Tracking:   tracked in all 4 quadrants smoothly    Saccades:  undershooting on the R side   Convergence:  breaks about 6 inches from nose  Acuity: able to read name tag with little difficulty.     ,   Perception:   Overall Perception Status: impaired  Left Right Discrimination: -- (R sided hemianopia)  Body Scheme: intact  Left Attention: intact  Right Attention: impaired   Visual Closure:  intact  Figure Ground:  intact  Depth Perception:  intact   Spatial Orientation:  intact  Visual Discrimination: R sided menianopia        Sensory: WNL  Spasticity: n/a    Coordination:   9 Hole Peg Test: R: 35.95 seconds   L: 19.99 seconds          Assessment  Patient is progressing well overall. He demonstrated increased safety awareness by identifying driving fatigue and recognizing the potential risk posed by right-sided hemianopia. However, the delayed realization of risk suggests that further intervention is warranted in the areas of executive functioning and judgment. Improvements in right-hand fine motor function may be linked to gains in right visual field awareness. Continued skilled OT is recommended to address remaining deficits in attention, abstraction, and delayed recall, and to further reinforce safety awareness and cognitive strategies in functional contexts.    Goals (to be met in 12 visits)   Pt will improve in 10 lbs in  strength to increase upper extremity strength, range of motion, and coordination to support daily tasks.  (MET)  Pt will enhance fine motor skills for improved hand-eye coordination and object manipulation to support self-care tasks, (MET)  Pt will increase endurance and activity tolerance to 30 minutes with no breaks to support participation in ADL/IADL. (MET)   Pt  will be independent and compliant with comprehensive HEP to maintain progress achieved in OT.  (MET)  Pt will improve sustained attention by independently completing a familiar task for 30 minutes with no breaks to support independence and success in activities like leisure and social participation.  (Progressing)  Pt will initiate and complete a multi-step task, such as meal preparation or household chores by independently organizing and following a written or digital checklist.  (Progressing)  Pt will improve safety awareness and judgement to promote independence in the home and community.  (NEW GOAL)      Treatment Last 4 Visits        6/23/2025 6/26/2025 6/30/2025 7/3/2025   OT Treatment   Treatment Day 6 7 8 9   Therapeutic Exercise    Reassessment    Therapeutic Activity Sodoku (medium 17 minutes)   Perfection  Operation dinosaur  Sodoku (medium 17 minutes)   Perfection  Operation dinosaur  Sodoku   Card matching(R side)    Therapeutic Exercise Min    45   Ther Activity Min 45 40 45    Total Timed Procedures 45 40 45 45   Total Service Procedures 45 40 45 45   Total Time 45 40 45 45         HEP  Luminosity   Cognitive recovery strategies: daily orientation    Charges     TEx3      Plan: Continue skilled Occupational Therapy 1-2 x/week or a total of 12 visits over a 90 day period. Treatment will include: Therapeutic Activities; Self-Care Home Management; Neuromuscular Re-education; Therapeutic Exercise; Home Exercise Program instruction; Electrical stimulation (attended); Ultrasound; Manual Therapy    Patient/Family/Caregiver was advised of these findings, precautions, and treatment options and has agreed to actively participate in planning and for this course of care.    Thank you for your referral. If you have any questions, please contact me at Dept: [phone].    Sincerely,  Electronically signed by therapist: Soni Ling OT     Physician's certification required:  Yes  Please co-sign or sign and  return this letter via fax as soon as possible to [phone].   I certify the need for these services furnished under this plan of treatment and while under my care.    X___________________________________________________ Date____________________    Certification From: 7/3/2025  To:10/1/2025

## 2025-07-07 ENCOUNTER — OFFICE VISIT (OUTPATIENT)
Dept: PHYSICAL THERAPY | Facility: HOSPITAL | Age: 40
End: 2025-07-07
Payer: COMMERCIAL

## 2025-07-07 ENCOUNTER — OFFICE VISIT (OUTPATIENT)
Dept: OCCUPATIONAL MEDICINE | Facility: HOSPITAL | Age: 40
End: 2025-07-07
Payer: COMMERCIAL

## 2025-07-07 PROCEDURE — 97110 THERAPEUTIC EXERCISES: CPT

## 2025-07-07 PROCEDURE — 97112 NEUROMUSCULAR REEDUCATION: CPT

## 2025-07-07 NOTE — PROGRESS NOTES
Patient: Ender Aguirre (40 year old, male) Referring Provider:  Insurance:   Diagnosis: Cognitive disorder (F09)  Impaired mobility (Z74.09)  Intraparenchymal hematoma of brain without loss of consciousness, sequela (S06.330S) Physician Isaac BOWER   Date of Surgery: 5/8/2025 Next MD visit:  N/A   Precautions:  None n/a Referral Information:   Date of Injury: 5/7/2025 Date of Evaluation: Req: 0, Auth: 0, Exp:     05/22/25 POC Auth Visits:          Today's Date   7/7/2025    Subjective  Patient presents to OT noting that he experiences difficulty with visual perception when exposed to direct sunlight. He reports that this issue does not occur when he is in shaded areas.       Pain: 0/10     Objective  Patient engaged in a cognitive activity requiring right visual field scanning to trace a path through a puzzle. This was followed by a game of O[place]uline, which he was unfamiliar with. Basic steps and game rules were provided in written format for reference.      PROGRESS  Orthotics: n/a     ROM and Strength:  (* denotes performed with pain)  Hand Strength (lbs) R L      105.6 lbs  103.4 lbs     2 pt Pinch 15 12     3 pt Pinch 18 19     Lateral pinch 21 21       Neurological:  Cognition:   Overall Cognitive Status: impaired  Arousal/Alertness: appropriate responses to stimuli  Attention Span: difficulty dividing attention  Orientation Level: oriented x4  Memory: impaired working memory  Initiation: appears intact  Motor Planning: intact  Perseveration: not present  Awareness of Errors: assistance required to identify errors made  Awareness of Deficits: aware of deficits  Safety Judgment: decreased awareness of need for safety   Problem Solving: assistance required to identify errors made     [place] Cognitive Assessment: 23/30 with deficits in:   Attention, abstraction, and delayed recall.       ,   Vision:    Current Vision: other  Visual History: brain injury  Patient Visual Report: blurring of print when  reading  Ocular Range of Motion: restricted on the right  Head Position: --  Tracking:   tracked in all 4 quadrants smoothly    Saccades:  undershooting on the R side   Convergence:  breaks about 6 inches from nose  Acuity: able to read name tag with little difficulty.     ,   Perception:   Overall Perception Status: impaired  Left Right Discrimination: -- (R sided hemianopia)  Body Scheme: intact  Left Attention: intact  Right Attention: impaired   Visual Closure:  intact  Figure Ground:  intact  Depth Perception:  intact   Spatial Orientation:  intact  Visual Discrimination: R sided Hemianopia        Coordination:   9 Hole Peg Test: R: 23.73 seconds   L: 20.01 seconds          Assessment  Patient demonstrated strong problem-solving and initiative by referring to the rule sheet independently and asking appropriate clarification questions when needed. His ability to use compensatory strategies effectively reflects good insight and progress in cognitive rehabilitation. Overall, he is doing well.    Goals (to be met in 12 visits)   Pt will improve in 10 lbs in  strength to increase upper extremity strength, range of motion, and coordination to support daily tasks.  (MET)  Pt will enhance fine motor skills for improved hand-eye coordination and object manipulation to support self-care tasks, (MET)  Pt will increase endurance and activity tolerance to 30 minutes with no breaks to support participation in ADL/IADL. (MET)   Pt will be independent and compliant with comprehensive HEP to maintain progress achieved in OT.  (MET)  Pt will improve sustained attention by independently completing a familiar task for 30 minutes with no breaks to support independence and success in activities like leisure and social participation.  (Progressing)  Pt will initiate and complete a multi-step task, such as meal preparation or household chores by independently organizing and following a written or digital checklist.   (Progressing)  Pt will improve safety awareness and judgement to promote independence in the home and community.  (NEW GOAL)       Plan  Patient will be seen 1-2 x/week or a total of 12 visits over a 90 day period. Treatment will include: Therapeutic Activities; Self-Care Home Management; Neuromuscular Re-education; Therapeutic Exercise; Home Exercise Program instruction; Electrical stimulation (attended); Ultrasound; Manual Therapy    Treatment Last 4 Visits        6/26/2025 6/30/2025 7/3/2025 7/7/2025   OT Treatment   Treatment Day 7 8 9 10   Therapeutic Exercise   Reassessment     Therapeutic Activity Sodoku (medium 17 minutes)   Perfection  Operation dinosaur  Sodoku   Card matching(R side)  Connecting the dots (1-10)  [place]   Blink   Therapeutic Exercise Min   45    Ther Activity Min 40 45  45   Total Timed Procedures 40 45 45 45   Total Service Procedures 40 45 45 45   Total Time 40 45 45 45         HEP  Luminosity   Cognitive recovery strategies: daily orientation    Charges     TAx3

## 2025-07-07 NOTE — PROGRESS NOTES
Patient: Ender Aguirre (40 year old, male) Referring Provider:  Insurance:   Diagnosis: Cognitive disorder (F09)  Impaired mobility (Z74.09)  Intraparenchymal hematoma of brain without loss of consciousness, sequela (S06.330S) Physician Alvatamalik BOWER   Date of Surgery: No data recorded Next MD visit:  N/A   Precautions:  Fall Risk No data recorded Referral Information:    Date of Evaluation: Req: 0, Auth: 0, Exp:     05/22/25 POC Auth Visits:  10       Today's Date   7/7/2025    Subjective  Pt reports he followed up with doctor since last visit. They feel that he is doing well, still working on the visual issues. Return to work has been fine so far, no headaches or symptoms with this.       Pain: pain not reported     Objective  See tx flow sheet         Assessment  Pt displays improving coordination and control throughout balance and strengthening activities. Continued with use of blaze pods with color catch with distracting colors, pt able to accurately hit red color only with only two misses throughout. Also completed game with one color assigned to UE and another assigned to opp LE, pt able to complete accurate hits on first round with no mistakes, however during second round has more difficulty following switching UE/LE. Pt displays good attendance to R side despite ongoing visual deficits. Next session: discharge planning.    Goals (to be met in 10 visits)      Not Met Progress Toward Partially Met Met   Pt will demonstrate improved SLS to >30 seconds YURIDIA to promote safety and decrease risk of falls on uneven surfaces such as grass and gravel. [] [x] [] []   Pt will complete a 6MWT to determine baseline walking endurance.  [] [x] [] []   Pt will perform FGA with score of 28/30 or greater with least restrictive AD to demonstrate ability to ambulate safely in crowded and busy environments such as the grocery store. [] [x] [] []   Pt will be able to ambulate without path deviation while completing dual task to  be able to safely navigate community spaces without losing balance. [] [x] [] []   Pt will complete NB EC balance on complaint surface with minimal to no sway to safely be able to  shower without loss of balance.  [] [x] [] []   Pt will be independent and compliant with comprehensive HEP to maintain progress achieved in PT. [] [x] [] []                                 Plan  Progress dynamic balance c cog activites.    Treatment Last 4 Visits  Treatment Day: 8 6/12/2025 6/16/2025 6/20/2025 7/7/2025   Neuro Treatment   Therapeutic Exercise 5 min biodex treadmill at 65 cm step length at 1.8 mph   DLP, #75, 3x15   SLP, #50, 2x10 bilat   Biodex treadmill, 6 min, 1.8 mph and 3% incline   Birddogs, x20   DLP, #87, 3x15   SLP, #62, x20 bilat   Hip extension on shuttle press, #50, 2x10 bilat  Pt education: HEP, safe things to do at the gym    Elliptical, 6 min, level 4  Monster walks with green band, fwd/ bkwd, 3 laps   Pt education: continue to progress gym activities as tolerated   Elliptical, 6 min, level 6  Plank position, UE walks over 4 in step, 2x10   Side plank with opp LE abductions, 2x12 bilat     Neuro Re-Education Blaze pods   - color catch, 3x30 sec with random call out for varying arm/leg  - one leg balance 2x30 sec bilat, second set counting backwards by 3's   - color catch in modified plank position 2x30 sec  SLS with card matches to board, verbally naming cards before placing, multiple bouts R/L   Tandem balance with ball toss to rebounder, 2x30 sec bilat   Blaze pods   - color catch, 4x30 sec with dual task of math problems   - one leg balance 2x30 sec bilat, arm hitting pod  BOSU squats, 2x15  Tandem stance on foam with taps to ski poles, 2x40 sec bilat     Blaze pods   - Random color with distraction, hitting with legs then with hands, multiple bouts  - one leg balance on airex 2x30 sec bilat   Step up to BOSU, with opp arm OH press and knee drive 5# weight, x10 bilat  SLS on BOSU (blue  down): holding bean bag on foot and tossing into basket  Tandem on airex, ball toss to rebounder, multiple bouts bilat, throwing with L and right hands   Blaze pods   - Color catch with distracting colors (hitting only red color), single leg stance, 2x30 sec bilat  - Color catch with purple or green assigned to 1 UE/LE, multiple bouts changing assigned colors to different UE/LE  Step up to BOSU, with opp arm OH press and knee drive 5# weight, x10 bilat   Lat quick steps over bosu, 3x30 sec   Single leg taps on wobble board, 2x40 sec bilat UE as needed  NB EC on airex, x30 sec   Semi tandem on airex, EC x30 sec bilat       Gait Training Walking with [employer] game, multiple bouts; alphabet game naming animals   Obstacle course with cones, hurdles, step over bosu and tandem walking on airex, completed forward and side stepping, added dual task of counting backwards by 3's       Therapeutic Exercise Minutes 10 25 10 10   Neuro Re-Educ Minutes 20 15 25 25   Gait Training Minutes 13      Total Time Of Timed Procedures 43 40 35 35   Total Time Of Service-Based Procedures 0 0 0 0   Total Treatment Time 43 40 35 35   HEP  Access Code: [email or web address]  URL: [email or web address]  Date: 06/16/2025  Prepared by: Minnie Davis    Exercises  - Side Stepping with Resistance at Ankles  - 1 x daily - 7 x weekly - 2 sets - 10 reps  - Forward Monster Walks  - 1 x daily - 7 x weekly - 2 sets - 10 reps  - Backward Monster Walks  - 1 x daily - 7 x weekly - 2 sets - 10 reps  - Single Leg Cone Pick-Up  - 1 x daily - 7 x weekly - 2 sets - 10 reps  - Tandem Walking with Counter Support  - 1 x daily - 7 x weekly - 3 sets - 10 reps          HEP  Access Code: [email or web address]  URL: [email or web address]  Date: 06/16/2025  Prepared by: Minnie Davis    Exercises  - Side Stepping with Resistance at Ankles  - 1 x daily - 7 x weekly - 2 sets - 10 reps  - Forward Monster Walks  - 1 x daily - 7 x weekly - 2 sets - 10 reps  -  Backward Monster Walks  - 1 x daily - 7 x weekly - 2 sets - 10 reps  - Single Leg Cone Pick-Up  - 1 x daily - 7 x weekly - 2 sets - 10 reps  - Tandem Walking with Counter Support  - 1 x daily - 7 x weekly - 3 sets - 10 reps    Charges  NR 2 TE 1

## 2025-07-10 ENCOUNTER — APPOINTMENT (OUTPATIENT)
Dept: PHYSICAL THERAPY | Facility: HOSPITAL | Age: 40
End: 2025-07-10
Payer: COMMERCIAL

## 2025-07-10 ENCOUNTER — OFFICE VISIT (OUTPATIENT)
Dept: OCCUPATIONAL MEDICINE | Facility: HOSPITAL | Age: 40
End: 2025-07-10
Payer: COMMERCIAL

## 2025-07-10 PROCEDURE — 97110 THERAPEUTIC EXERCISES: CPT

## 2025-07-10 NOTE — PROGRESS NOTES
Discharge Summary  Pt has attended 11 visits in Occupational Therapy.      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       Pain: 0/10     Objective  Patient demonstrates significant improvement in overall cognitive function, scoring 27/30 on the [place] Cognitive Assessment. Areas of difficulty remain in visuospatial processing and delayed recall.      PROGRESS  Orthotics: n/a     ROM and Strength:  (* denotes performed with pain)  Hand Strength (lbs) R L      108.0 lbs  105.8 lbs     2 pt Pinch 15 14     3 pt Pinch 21 22     Lateral pinch 25  24       Neurological:  Cognition:   Overall Cognitive Status: Within functional limits  Arousal/Alertness: appropriate responses to stimuli  Attention Span: intact  Orientation Level: oriented x4  Memory: impaired working memory  Initiation: appears intact  Motor Planning: intact  Perseveration: not present  Awareness of Errors: able to identify errors independently   Awareness of Deficits: aware of deficits  Safety Judgment: good safety awareness  Problem Solving: able to identify errors made    Patrice Cognitive Assessment: 27/30       ,   Vision:    Current Vision: other  Visual History: brain injury  Patient Visual Report: blurring of print when reading  Ocular Range of Motion: restricted on the right  Head Position: --  Tracking:   tracked in all 4 quadrants smoothly    Saccades:  undershooting on the R side    Convergence:  breaks about 6 inches from nose  Acuity: able to read name tag with little difficulty.     ,   Perception:   Overall Perception Status: impaired  Left Right Discrimination: -- (R sided hemianopia)  Body Scheme: intact  Left Attention: intact  Right Attention: impaired   Visual Closure:  intact  Figure Ground:  intact  Depth Perception:  intact   Spatial Orientation:  intact  Visual Discrimination: R sided Hemianopia        Coordination:   9 Hole Peg Test: R: 23.73 seconds   L: 20.01 seconds     PROGRESS  Orthotics: n/a     ROM and Strength:  (* denotes performed with pain)  Hand Strength (lbs) R L      105.6 lbs  103.4 lbs     2 pt Pinch 15 12     3 pt Pinch 18 19     Lateral pinch 21 21       Neurological:  Cognition:   Overall Cognitive Status: impaired  Arousal/Alertness: appropriate responses to stimuli  Attention Span: difficulty dividing attention  Orientation Level: oriented x4  Memory: impaired working memory  Initiation: appears intact  Motor Planning: intact  Perseveration: not present  Awareness of Errors: assistance required to identify errors made  Awareness of Deficits: aware of deficits  Safety Judgment: decreased awareness of need for safety   Problem Solving: assistance required to identify errors made     Patrice Cognitive Assessment: 23/30 with deficits in:   Attention, abstraction, and delayed recall.       ,   Vision:    Current Vision: other  Visual History: brain injury  Patient Visual Report: blurring of print when reading  Ocular Range of Motion: restricted on the right  Head Position: --  Tracking:   tracked in all 4 quadrants smoothly    Saccades:  undershooting on the R side   Convergence:  breaks about 6 inches from nose  Acuity: able to read name tag with little difficulty.     ,   Perception:   Overall Perception Status: impaired  Left Right Discrimination: -- (R sided hemianopia)  Body Scheme: intact  Left Attention: intact  Right Attention: impaired   Visual  Closure:  intact  Figure Ground:  intact  Depth Perception:  intact   Spatial Orientation:  intact  Visual Discrimination: R sided Hemianopia        Coordination:   9 Hole Peg Test: R: 23.73 seconds   L: 20.01 seconds          Assessment  Patient is showing strong cognitive progress and reports high confidence in his ability to maintain and build upon these gains through continued participation in the home exercise program. Given his motivation, and performance, he is well-positioned for continued success independently.    Goals (to be met in 12 visits)   Pt will improve in 10 lbs in  strength to increase upper extremity strength, range of motion, and coordination to support daily tasks.  (MET)  Pt will enhance fine motor skills for improved hand-eye coordination and object manipulation to support self-care tasks, (MET)  Pt will increase endurance and activity tolerance to 30 minutes with no breaks to support participation in ADL/IADL. (MET)   Pt will be independent and compliant with comprehensive HEP to maintain progress achieved in OT.  (MET)  Pt will improve sustained attention by independently completing a familiar task for 30 minutes with no breaks to support independence and success in activities like leisure and social participation.  (MET)  Pt will initiate and complete a multi-step task, such as meal preparation or household chores by independently organizing and following a written or digital checklist.  (MET)  Pt will improve safety awareness and judgement to promote independence in the home and community.  (MET)        Treatment Last 4 Visits        6/30/2025 7/3/2025 7/7/2025 7/10/2025   OT Treatment   Treatment Day 8 9 10    Therapeutic Exercise  Reassessment   Reassessment   Therapeutic Activity Sodoku   Card matching(R side)  Connecting the dots (1-10)  Guzman Fernández    Therapeutic Exercise Min  45  35   Ther Activity Min 45  45    Total Timed Procedures 45 45 45 35   Total Service Procedures  45 45 45 35   Total Time 45 45 45 35         HEP  Luminosity   Cognitive recovery strategies: daily orientation    Charges     TEx2    Post QuickDASH Outcome Score  Post Score: 0 % (7/10/2025  1:09 PM)    2.27 % improvement    Plan  Discontinue skilled OT    Patient/Family/Caregiver was advised of these findings, precautions, and treatment options and has agreed to actively participate in planning and for this course of care.    Thank you for your referral. If you have any questions, please contact me at Dept: [phone].    Sincerely,  Electronically signed by therapist: Soni Ling, OT     Physician's certification required:  Yes  Please co-sign or sign and return this letter via fax as soon as possible to [phone].   I certify the need for these services furnished under this plan of treatment and while under my care.    X___________________________________________________ Date____________________    Certification From: 7/10/2025  To:10/8/2025

## 2025-07-14 ENCOUNTER — APPOINTMENT (OUTPATIENT)
Dept: PHYSICAL THERAPY | Facility: HOSPITAL | Age: 40
End: 2025-07-14
Payer: COMMERCIAL

## 2025-07-21 ENCOUNTER — APPOINTMENT (OUTPATIENT)
Dept: PHYSICAL THERAPY | Facility: HOSPITAL | Age: 40
End: 2025-07-21
Payer: COMMERCIAL

## 2025-07-24 ENCOUNTER — APPOINTMENT (OUTPATIENT)
Dept: PHYSICAL THERAPY | Facility: HOSPITAL | Age: 40
End: 2025-07-24
Payer: COMMERCIAL
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
## (undated) NOTE — LETTER
Patient Name: Ender Aguirre  YOB: 1985          MRN number:  [id number]  Date:  6/9/2025  Referring Physician:  Physician Nonstaff    Discharge Summary  Pt has attended 5 visits in Speech Therapy.   Today's Date   6/9/2025        Treatment Day: 5    Dear Dr. Somers  This letter is to inform you of Tanmay Aguirre's progress in speech-language therapy.    Since his initial evaluation, Tanmay has attended 5 sessions. Therapy sessions have targeted cognitive communication. A home exercise program (HEP) addressing use of compensatory strategies in the home has been provided and completed consistently. During this treatment period, Tanmay has demonstrated improved ability to communicate utilizing lexical retrieval, written expression, auditory comprehension, and reading comprehension skills. Tanmay continues to demonstrate need for cognitive therapy, and he is currently receiving this from occupational therapy. To target Tanmay's rehabilitative focus, it is recommended that he continue with OT cognitive therapy and discontinue from speech therapy at this time. Please re-consult given future need.      Subjective  Patient arrived on time to session following OT session. Participated actively in therapeutic tasks. Patient to continue cognitive therapy with occupational therapy as he does not present with verbal expression, auditory comprehension, reading comprehension, or written expression deficits at this time. Patient's occupational therapy is targeting cognition and will be sufficient for patient's needs as this time.       Pain: 0/10     Objective  See goals below.   Goals (to be met in 12 visits)  Patient will verbalize compensatory memory strategies (processing, working memory, short-term memory, attention, problem-solving) and describe 1 use per strategy given min verbal and visual cues.   Reports attempts to write information down. Will continue to try to embed in daily routine.   Progress: Goal discontinued   2.    Patient will utilize an external memory aid to recall daily activities and/or prospective tasks within 80% of opportunities given min verbal and visual cues.   Continue to discuss use of external memory aids within the home and at appointments   Progress:   Goal discontinued   3.   Patient will recall average of 6+ units of novel information after 5 minute delay given mod verbal and visual cues for retrieval.   10 units after 15 minute delay following repetition of article and mod verbal and visual cues for complex encoding of information.   Progress: Goal met.   4.   Patient will demonstrate sustained attention by maintaining focus during a task for 10 minutes given min verbal and visual cues in a structured environment without distractions.   10 minutes given min verbal and visual cues.   Progress: Goal met.   5.  Patient will describe pictures/objects using average of 4 attributes (e.g. function, material, location, etc) given min verbal and visual cues.   Patient exhibits functional lexical retrieval for daily communication.   Progress: Goal met.        Assessment  Patient presents with cognitive deficits c/b impulsivity, impaired memory, attention, problem solving, and executive functioning. Patient has improved upon lexical retrieval and effective communication. Patient demonstrates receptiveness to training of compensatory strategies, however continues to require cognitive therapy. As patient is completing cognitive therapy with occupational therapy, it is recommended that he be discharged from speech therapy with focus on OT cognitive tasks.    Plan  Discontinue speech therapy and continuation of cognitive therapy through occupational therapy discipline.    HEP  WRAP, spaced retrieval, word finding strategies, reading comprehension     Charges  [id number]    Total Treatment Time: 45 min    Patient/Family/Caregiver was advised of these findings, precautions, and treatment options and has agreed to actively  participate in planning and for this course of care.    Thank you for your referral. If you have any questions, please contact me at Dept: [phone].    Sincerely,  Electronically signed by therapist: ELSY Arnold     21st Century Cures Act Notice to Patient: Medical documents like this are made available to patients in the interest of transparency. However, be advised this is a medical document and it is intended as [id number] communication between your medical providers. This medical document may contain abbreviations, assessments, medical data, and results or other terms that are unfamiliar. Medical documents are intended to carry relevant information, facts as evident, and the clinical opinion of the practitioner. As such, this medical document may be written in language that appears blunt or direct. You are encouraged to contact your medical provider and/or [facility] Patient Experience if you have any questions about this medical document.